# Patient Record
Sex: FEMALE | Race: BLACK OR AFRICAN AMERICAN | Employment: OTHER | URBAN - METROPOLITAN AREA
[De-identification: names, ages, dates, MRNs, and addresses within clinical notes are randomized per-mention and may not be internally consistent; named-entity substitution may affect disease eponyms.]

---

## 2017-01-01 ENCOUNTER — TELEPHONE (OUTPATIENT)
Dept: INTERNAL MEDICINE CLINIC | Age: 67
End: 2017-01-01

## 2017-01-01 ENCOUNTER — OFFICE VISIT (OUTPATIENT)
Dept: INTERNAL MEDICINE CLINIC | Age: 67
End: 2017-01-01

## 2017-01-01 ENCOUNTER — TELEPHONE (OUTPATIENT)
Dept: NEUROLOGY | Age: 67
End: 2017-01-01

## 2017-01-01 ENCOUNTER — HOSPITAL ENCOUNTER (OUTPATIENT)
Dept: MRI IMAGING | Age: 67
Discharge: HOME | End: 2017-06-09
Attending: PSYCHIATRY & NEUROLOGY
Payer: MEDICARE

## 2017-01-01 ENCOUNTER — HOSPITAL ENCOUNTER (OUTPATIENT)
Dept: MRI IMAGING | Age: 67
Discharge: HOME | End: 2017-05-17
Attending: PSYCHIATRY & NEUROLOGY

## 2017-01-01 ENCOUNTER — HOSPITAL ENCOUNTER (OUTPATIENT)
Dept: SLEEP MEDICINE | Age: 67
Discharge: HOME | End: 2017-07-31
Attending: INTERNAL MEDICINE
Payer: MEDICARE

## 2017-01-01 ENCOUNTER — HOSPITAL ENCOUNTER (OUTPATIENT)
Dept: LAB | Age: 67
Discharge: HOME | End: 2017-07-26
Payer: MEDICARE

## 2017-01-01 ENCOUNTER — OFFICE VISIT (OUTPATIENT)
Dept: NEUROLOGY | Age: 67
End: 2017-01-01

## 2017-01-01 ENCOUNTER — DOCUMENTATION ONLY (OUTPATIENT)
Dept: INTERNAL MEDICINE CLINIC | Age: 67
End: 2017-01-01

## 2017-01-01 ENCOUNTER — HOSPITAL ENCOUNTER (OUTPATIENT)
Dept: SLEEP MEDICINE | Age: 67
Discharge: HOME | End: 2017-04-14
Payer: MEDICARE

## 2017-01-01 ENCOUNTER — DOCUMENTATION ONLY (OUTPATIENT)
Dept: SLEEP MEDICINE | Age: 67
End: 2017-01-01

## 2017-01-01 ENCOUNTER — TELEPHONE (OUTPATIENT)
Dept: SLEEP MEDICINE | Age: 67
End: 2017-01-01

## 2017-01-01 ENCOUNTER — HOSPITAL ENCOUNTER (EMERGENCY)
Age: 67
Discharge: HOME | End: 2017-03-04
Attending: EMERGENCY MEDICINE
Payer: MEDICARE

## 2017-01-01 ENCOUNTER — OFFICE VISIT (OUTPATIENT)
Dept: SLEEP MEDICINE | Age: 67
End: 2017-01-01

## 2017-01-01 ENCOUNTER — HOSPITAL ENCOUNTER (OUTPATIENT)
Dept: LAB | Age: 67
Discharge: HOME | End: 2017-04-19
Payer: MEDICARE

## 2017-01-01 ENCOUNTER — HOSPITAL ENCOUNTER (OUTPATIENT)
Dept: SLEEP MEDICINE | Age: 67
Discharge: HOME | End: 2017-09-26
Payer: MEDICARE

## 2017-01-01 ENCOUNTER — HOSPITAL ENCOUNTER (OUTPATIENT)
Dept: MAMMOGRAPHY | Age: 67
Discharge: HOME | End: 2017-08-08
Attending: INTERNAL MEDICINE
Payer: MEDICARE

## 2017-01-01 ENCOUNTER — APPOINTMENT (OUTPATIENT)
Dept: GENERAL RADIOLOGY | Age: 67
End: 2017-01-01
Attending: EMERGENCY MEDICINE
Payer: MEDICARE

## 2017-01-01 ENCOUNTER — DOCUMENTATION ONLY (OUTPATIENT)
Dept: NEUROLOGY | Age: 67
End: 2017-01-01

## 2017-01-01 ENCOUNTER — HOSPITAL ENCOUNTER (OUTPATIENT)
Dept: SLEEP MEDICINE | Age: 67
Discharge: HOME | End: 2017-03-10
Attending: INTERNAL MEDICINE
Payer: MEDICARE

## 2017-01-01 VITALS
RESPIRATION RATE: 16 BRPM | WEIGHT: 252 LBS | DIASTOLIC BLOOD PRESSURE: 87 MMHG | OXYGEN SATURATION: 94 % | HEART RATE: 100 BPM | HEIGHT: 65 IN | TEMPERATURE: 97.7 F | SYSTOLIC BLOOD PRESSURE: 144 MMHG | BODY MASS INDEX: 41.99 KG/M2

## 2017-01-01 VITALS
HEART RATE: 87 BPM | DIASTOLIC BLOOD PRESSURE: 74 MMHG | BODY MASS INDEX: 43.78 KG/M2 | HEIGHT: 65 IN | OXYGEN SATURATION: 95 % | RESPIRATION RATE: 20 BRPM | SYSTOLIC BLOOD PRESSURE: 139 MMHG | TEMPERATURE: 98.1 F | WEIGHT: 262.8 LBS

## 2017-01-01 VITALS
HEART RATE: 78 BPM | RESPIRATION RATE: 18 BRPM | SYSTOLIC BLOOD PRESSURE: 140 MMHG | HEIGHT: 65 IN | BODY MASS INDEX: 44.98 KG/M2 | DIASTOLIC BLOOD PRESSURE: 88 MMHG | WEIGHT: 270 LBS | OXYGEN SATURATION: 91 %

## 2017-01-01 VITALS
SYSTOLIC BLOOD PRESSURE: 122 MMHG | BODY MASS INDEX: 43.99 KG/M2 | WEIGHT: 264 LBS | DIASTOLIC BLOOD PRESSURE: 70 MMHG | HEIGHT: 65 IN | HEART RATE: 79 BPM | RESPIRATION RATE: 18 BRPM

## 2017-01-01 VITALS
SYSTOLIC BLOOD PRESSURE: 122 MMHG | HEART RATE: 101 BPM | DIASTOLIC BLOOD PRESSURE: 81 MMHG | HEIGHT: 65 IN | WEIGHT: 264.5 LBS | TEMPERATURE: 97.4 F | OXYGEN SATURATION: 94 % | BODY MASS INDEX: 44.07 KG/M2

## 2017-01-01 VITALS
HEART RATE: 86 BPM | TEMPERATURE: 98.6 F | WEIGHT: 266 LBS | OXYGEN SATURATION: 93 % | RESPIRATION RATE: 12 BRPM | BODY MASS INDEX: 44.32 KG/M2 | HEIGHT: 65 IN | DIASTOLIC BLOOD PRESSURE: 80 MMHG | SYSTOLIC BLOOD PRESSURE: 149 MMHG

## 2017-01-01 VITALS
OXYGEN SATURATION: 95 % | WEIGHT: 259.1 LBS | HEART RATE: 96 BPM | BODY MASS INDEX: 43.17 KG/M2 | SYSTOLIC BLOOD PRESSURE: 135 MMHG | DIASTOLIC BLOOD PRESSURE: 79 MMHG | HEIGHT: 65 IN

## 2017-01-01 VITALS
HEIGHT: 65 IN | SYSTOLIC BLOOD PRESSURE: 130 MMHG | BODY MASS INDEX: 44.62 KG/M2 | DIASTOLIC BLOOD PRESSURE: 89 MMHG | WEIGHT: 267.8 LBS | HEART RATE: 83 BPM | OXYGEN SATURATION: 92 %

## 2017-01-01 VITALS
WEIGHT: 261 LBS | DIASTOLIC BLOOD PRESSURE: 76 MMHG | SYSTOLIC BLOOD PRESSURE: 128 MMHG | TEMPERATURE: 97.6 F | OXYGEN SATURATION: 94 % | HEART RATE: 77 BPM | RESPIRATION RATE: 24 BRPM | HEIGHT: 65 IN | BODY MASS INDEX: 43.49 KG/M2

## 2017-01-01 VITALS
RESPIRATION RATE: 20 BRPM | BODY MASS INDEX: 42.82 KG/M2 | OXYGEN SATURATION: 94 % | TEMPERATURE: 97.3 F | WEIGHT: 257 LBS | DIASTOLIC BLOOD PRESSURE: 86 MMHG | HEIGHT: 65 IN | SYSTOLIC BLOOD PRESSURE: 135 MMHG | HEART RATE: 86 BPM

## 2017-01-01 VITALS
DIASTOLIC BLOOD PRESSURE: 76 MMHG | WEIGHT: 257 LBS | HEIGHT: 65 IN | BODY MASS INDEX: 42.82 KG/M2 | RESPIRATION RATE: 18 BRPM | HEART RATE: 78 BPM | SYSTOLIC BLOOD PRESSURE: 122 MMHG

## 2017-01-01 DIAGNOSIS — R35.0 FREQUENT URINATION: Primary | ICD-10-CM

## 2017-01-01 DIAGNOSIS — I10 ESSENTIAL HYPERTENSION: ICD-10-CM

## 2017-01-01 DIAGNOSIS — M48.061 LUMBAR STENOSIS: ICD-10-CM

## 2017-01-01 DIAGNOSIS — R26.9 GAIT DISTURBANCE: ICD-10-CM

## 2017-01-01 DIAGNOSIS — G47.33 OSA (OBSTRUCTIVE SLEEP APNEA): ICD-10-CM

## 2017-01-01 DIAGNOSIS — E87.6 HYPOKALEMIA: ICD-10-CM

## 2017-01-01 DIAGNOSIS — B86 SCABIES INFESTATION: Primary | ICD-10-CM

## 2017-01-01 DIAGNOSIS — J44.9 CHRONIC OBSTRUCTIVE PULMONARY DISEASE, UNSPECIFIED COPD TYPE (HCC): ICD-10-CM

## 2017-01-01 DIAGNOSIS — G47.31 COMPLEX SLEEP APNEA SYNDROME: ICD-10-CM

## 2017-01-01 DIAGNOSIS — G47.31 COMPLEX SLEEP APNEA SYNDROME: Primary | ICD-10-CM

## 2017-01-01 DIAGNOSIS — G24.01 TARDIVE DYSKINESIA: ICD-10-CM

## 2017-01-01 DIAGNOSIS — I10 ESSENTIAL HYPERTENSION: Primary | ICD-10-CM

## 2017-01-01 DIAGNOSIS — R29.6 FALLS FREQUENTLY: ICD-10-CM

## 2017-01-01 DIAGNOSIS — B86 SCABIES INFESTATION: ICD-10-CM

## 2017-01-01 DIAGNOSIS — Z00.00 ROUTINE GENERAL MEDICAL EXAMINATION AT A HEALTH CARE FACILITY: ICD-10-CM

## 2017-01-01 DIAGNOSIS — R60.0 LOCALIZED EDEMA: ICD-10-CM

## 2017-01-01 DIAGNOSIS — M48.061 SPINAL STENOSIS OF LUMBAR REGION WITHOUT NEUROGENIC CLAUDICATION: ICD-10-CM

## 2017-01-01 DIAGNOSIS — R52 PAIN: ICD-10-CM

## 2017-01-01 DIAGNOSIS — M48.061 SPINAL STENOSIS OF LUMBAR REGION: Primary | ICD-10-CM

## 2017-01-01 DIAGNOSIS — G40.909 SEIZURE DISORDER (HCC): Primary | ICD-10-CM

## 2017-01-01 DIAGNOSIS — R20.0 BILATERAL LEG NUMBNESS: ICD-10-CM

## 2017-01-01 DIAGNOSIS — B35.4 TINEA CORPORIS: Primary | ICD-10-CM

## 2017-01-01 DIAGNOSIS — G40.909 SEIZURE DISORDER (HCC): ICD-10-CM

## 2017-01-01 DIAGNOSIS — G47.33 OSA TREATED WITH BIPAP: ICD-10-CM

## 2017-01-01 DIAGNOSIS — Z13.39 SCREENING FOR ALCOHOLISM: ICD-10-CM

## 2017-01-01 DIAGNOSIS — S92.902A FOOT FRACTURE, LEFT, CLOSED, INITIAL ENCOUNTER: Primary | ICD-10-CM

## 2017-01-01 DIAGNOSIS — M48.061 SPINAL STENOSIS OF LUMBAR REGION: ICD-10-CM

## 2017-01-01 DIAGNOSIS — F25.9 SCHIZOAFFECTIVE DISORDER, UNSPECIFIED TYPE (HCC): ICD-10-CM

## 2017-01-01 DIAGNOSIS — G47.33 OSA (OBSTRUCTIVE SLEEP APNEA): Primary | ICD-10-CM

## 2017-01-01 DIAGNOSIS — M79.642 LEFT HAND PAIN: ICD-10-CM

## 2017-01-01 DIAGNOSIS — Z87.898 HISTORY OF EPISTAXIS: ICD-10-CM

## 2017-01-01 DIAGNOSIS — G47.33 OSA TREATED WITH BIPAP: Primary | ICD-10-CM

## 2017-01-01 DIAGNOSIS — Z13.220 SCREENING FOR LIPID DISORDERS: ICD-10-CM

## 2017-01-01 DIAGNOSIS — R10.9 CHRONIC ABDOMINAL PAIN: ICD-10-CM

## 2017-01-01 DIAGNOSIS — F25.9 SCHIZOAFFECTIVE DISORDER, UNSPECIFIED TYPE (HCC): Primary | ICD-10-CM

## 2017-01-01 DIAGNOSIS — Z12.31 VISIT FOR SCREENING MAMMOGRAM: ICD-10-CM

## 2017-01-01 DIAGNOSIS — M54.2 NECK PAIN: ICD-10-CM

## 2017-01-01 DIAGNOSIS — E66.2 OBESITY HYPOVENTILATION SYNDROME (HCC): ICD-10-CM

## 2017-01-01 DIAGNOSIS — G89.29 CHRONIC ABDOMINAL PAIN: ICD-10-CM

## 2017-01-01 DIAGNOSIS — Z23 ENCOUNTER FOR IMMUNIZATION: ICD-10-CM

## 2017-01-01 LAB
ALBUMIN SERPL-MCNC: 4 G/DL (ref 3.6–4.8)
ALBUMIN/GLOB SERPL: 1.2 {RATIO} (ref 1.2–2.2)
ALP SERPL-CCNC: 72 IU/L (ref 39–117)
ALT SERPL-CCNC: 13 IU/L (ref 0–32)
AST SERPL-CCNC: 16 IU/L (ref 0–40)
BILIRUB SERPL-MCNC: <0.2 MG/DL (ref 0–1.2)
BILIRUB UR QL STRIP: NEGATIVE
BUN SERPL-MCNC: 23 MG/DL (ref 8–27)
BUN SERPL-MCNC: 25 MG/DL (ref 8–27)
BUN/CREAT SERPL: 30 (ref 12–28)
BUN/CREAT SERPL: 31 (ref 12–28)
CALCIUM SERPL-MCNC: 10 MG/DL (ref 8.7–10.3)
CALCIUM SERPL-MCNC: 9.4 MG/DL (ref 8.7–10.3)
CHLORIDE SERPL-SCNC: 102 MMOL/L (ref 96–106)
CHLORIDE SERPL-SCNC: 105 MMOL/L (ref 96–106)
CHOLEST SERPL-MCNC: 166 MG/DL (ref 100–199)
CO2 SERPL-SCNC: 22 MMOL/L (ref 18–29)
CO2 SERPL-SCNC: 27 MMOL/L (ref 18–29)
CREAT SERPL-MCNC: 0.75 MG/DL (ref 0.57–1)
CREAT SERPL-MCNC: 0.84 MG/DL (ref 0.57–1)
GLOBULIN SER CALC-MCNC: 3.3 G/DL (ref 1.5–4.5)
GLUCOSE SERPL-MCNC: 75 MG/DL (ref 65–99)
GLUCOSE SERPL-MCNC: 87 MG/DL (ref 65–99)
GLUCOSE UR-MCNC: NEGATIVE MG/DL
HDLC SERPL-MCNC: 56 MG/DL
KETONES P FAST UR STRIP-MCNC: NEGATIVE MG/DL
LDLC SERPL CALC-MCNC: 97 MG/DL (ref 0–99)
PH UR STRIP: 5.5 [PH] (ref 4.6–8)
POTASSIUM SERPL-SCNC: 4.4 MMOL/L (ref 3.5–5.2)
POTASSIUM SERPL-SCNC: 4.7 MMOL/L (ref 3.5–5.2)
PROT SERPL-MCNC: 7.3 G/DL (ref 6–8.5)
PROT UR QL STRIP: NEGATIVE MG/DL
SODIUM SERPL-SCNC: 143 MMOL/L (ref 134–144)
SODIUM SERPL-SCNC: 144 MMOL/L (ref 134–144)
SP GR UR STRIP: 1.01 (ref 1–1.03)
TRIGL SERPL-MCNC: 67 MG/DL (ref 0–149)
UA UROBILINOGEN AMB POC: NORMAL (ref 0.2–1)
URINALYSIS CLARITY POC: CLEAR
URINALYSIS COLOR POC: YELLOW
URINE BLOOD POC: NEGATIVE
URINE LEUKOCYTES POC: NEGATIVE
URINE NITRITES POC: NEGATIVE
VLDLC SERPL CALC-MCNC: 13 MG/DL (ref 5–40)

## 2017-01-01 PROCEDURE — 74011250637 HC RX REV CODE- 250/637: Performed by: NURSE PRACTITIONER

## 2017-01-01 PROCEDURE — 73630 X-RAY EXAM OF FOOT: CPT

## 2017-01-01 PROCEDURE — 80061 LIPID PANEL: CPT

## 2017-01-01 PROCEDURE — 80053 COMPREHEN METABOLIC PANEL: CPT

## 2017-01-01 PROCEDURE — 95811 POLYSOM 6/>YRS CPAP 4/> PARM: CPT | Performed by: INTERNAL MEDICINE

## 2017-01-01 PROCEDURE — 95811 POLYSOM 6/>YRS CPAP 4/> PARM: CPT

## 2017-01-01 PROCEDURE — 77067 SCR MAMMO BI INCL CAD: CPT

## 2017-01-01 PROCEDURE — 80048 BASIC METABOLIC PNL TOTAL CA: CPT

## 2017-01-01 PROCEDURE — 72148 MRI LUMBAR SPINE W/O DYE: CPT

## 2017-01-01 PROCEDURE — 95810 POLYSOM 6/> YRS 4/> PARAM: CPT | Performed by: INTERNAL MEDICINE

## 2017-01-01 PROCEDURE — 99284 EMERGENCY DEPT VISIT MOD MDM: CPT

## 2017-01-01 PROCEDURE — 73130 X-RAY EXAM OF HAND: CPT

## 2017-01-01 RX ORDER — IVERMECTIN 3 MG/1
12 TABLET ORAL ONCE
Qty: 4 TAB | Refills: 1 | Status: SHIPPED | OUTPATIENT
Start: 2017-01-01 | End: 2017-01-01 | Stop reason: SDUPTHER

## 2017-01-01 RX ORDER — IVERMECTIN 3 MG/1
12 TABLET ORAL ONCE
Qty: 4 TAB | Refills: 1 | OUTPATIENT
Start: 2017-01-01 | End: 2017-01-01

## 2017-01-01 RX ORDER — HYDROCODONE BITARTRATE AND ACETAMINOPHEN 5; 325 MG/1; MG/1
1 TABLET ORAL
Qty: 8 TAB | Refills: 0 | Status: SHIPPED | OUTPATIENT
Start: 2017-01-01 | End: 2017-01-01

## 2017-01-01 RX ORDER — IBUPROFEN 400 MG/1
800 TABLET ORAL
Status: COMPLETED | OUTPATIENT
Start: 2017-01-01 | End: 2017-01-01

## 2017-01-01 RX ORDER — IVERMECTIN 3 MG/1
12 TABLET ORAL ONCE
Qty: 4 TAB | Refills: 0 | Status: SHIPPED | OUTPATIENT
Start: 2017-01-01 | End: 2017-01-01 | Stop reason: SDUPTHER

## 2017-01-01 RX ORDER — TRAMADOL HYDROCHLORIDE 50 MG/1
100 TABLET ORAL
Status: COMPLETED | OUTPATIENT
Start: 2017-01-01 | End: 2017-01-01

## 2017-01-01 RX ORDER — BUTALBITAL, ACETAMINOPHEN AND CAFFEINE 300; 40; 50 MG/1; MG/1; MG/1
CAPSULE ORAL
COMMUNITY

## 2017-01-01 RX ORDER — POTASSIUM CHLORIDE 40 MEQ/15ML
SOLUTION ORAL
Qty: 480 ML | Refills: 1 | Status: SHIPPED | OUTPATIENT
Start: 2017-01-01 | End: 2017-01-01 | Stop reason: SDUPTHER

## 2017-01-01 RX ORDER — FEXOFENADINE HCL 60 MG
30 TABLET ORAL DAILY
Qty: 30 TAB | Refills: 0 | Status: SHIPPED | OUTPATIENT
Start: 2017-01-01 | End: 2017-01-01

## 2017-01-01 RX ORDER — LORATADINE 10 MG/1
10 TABLET ORAL
Qty: 30 TAB | Refills: 2 | Status: SHIPPED | OUTPATIENT
Start: 2017-01-01

## 2017-01-01 RX ORDER — DOXYLAMINE SUCCINATE 25 MG
TABLET ORAL 2 TIMES DAILY
Qty: 15 G | Refills: 0 | Status: SHIPPED | OUTPATIENT
Start: 2017-01-01 | End: 2017-01-01 | Stop reason: DRUGHIGH

## 2017-01-01 RX ORDER — POTASSIUM CHLORIDE 40 MEQ/15ML
SOLUTION ORAL
Qty: 473 ML | Refills: 1 | Status: SHIPPED | OUTPATIENT
Start: 2017-01-01

## 2017-01-01 RX ORDER — IBUPROFEN 800 MG/1
800 TABLET ORAL
Qty: 20 TAB | Refills: 0 | Status: SHIPPED | OUTPATIENT
Start: 2017-01-01 | End: 2017-01-01

## 2017-01-01 RX ORDER — DIVALPROEX SODIUM 500 MG/1
500 TABLET, DELAYED RELEASE ORAL 2 TIMES DAILY
Qty: 180 TAB | Refills: 1 | Status: SHIPPED | OUTPATIENT
Start: 2017-01-01 | End: 2018-01-01 | Stop reason: SDUPTHER

## 2017-01-01 RX ORDER — FUROSEMIDE 20 MG/1
20 TABLET ORAL EVERY OTHER DAY
Refills: 0 | COMMUNITY
Start: 2017-01-01 | End: 2017-01-01

## 2017-01-01 RX ORDER — TRIAMCINOLONE ACETONIDE 1 MG/G
OINTMENT TOPICAL 2 TIMES DAILY
Qty: 30 G | Refills: 0 | Status: SHIPPED | OUTPATIENT
Start: 2017-01-01 | End: 2017-01-01 | Stop reason: SDUPTHER

## 2017-01-01 RX ORDER — FUROSEMIDE 20 MG/1
20 TABLET ORAL DAILY
Refills: 0 | COMMUNITY
Start: 2017-01-01 | End: 2017-01-01

## 2017-01-01 RX ORDER — IVERMECTIN 3 MG/1
TABLET ORAL
Refills: 0 | OUTPATIENT
Start: 2017-01-01

## 2017-01-01 RX ORDER — BACLOFEN 10 MG/1
TABLET ORAL
Qty: 90 TAB | Refills: 1 | Status: SHIPPED | OUTPATIENT
Start: 2017-01-01

## 2017-01-01 RX ORDER — IVERMECTIN 3 MG/1
TABLET ORAL ONCE
COMMUNITY
End: 2017-01-01

## 2017-01-01 RX ORDER — PNEUMOCOCCAL 13-VALENT CONJUGATE VACCINE 2.2; 2.2; 2.2; 2.2; 2.2; 4.4; 2.2; 2.2; 2.2; 2.2; 2.2; 2.2; 2.2 UG/.5ML; UG/.5ML; UG/.5ML; UG/.5ML; UG/.5ML; UG/.5ML; UG/.5ML; UG/.5ML; UG/.5ML; UG/.5ML; UG/.5ML; UG/.5ML; UG/.5ML
INJECTION, SUSPENSION INTRAMUSCULAR
Refills: 0 | COMMUNITY
Start: 2017-01-01 | End: 2017-01-01

## 2017-01-01 RX ORDER — NAPROXEN 500 MG/1
TABLET ORAL
Refills: 0 | COMMUNITY
Start: 2017-01-01

## 2017-01-01 RX ORDER — OXYMETAZOLINE HCL 0.05 %
2 SPRAY, NON-AEROSOL (ML) NASAL 2 TIMES DAILY
Qty: 1 EACH | Refills: 0 | Status: SHIPPED | OUTPATIENT
Start: 2017-01-01 | End: 2017-01-01

## 2017-01-01 RX ORDER — IVERMECTIN 3 MG/1
TABLET ORAL
Refills: 0 | COMMUNITY
Start: 2017-01-01 | End: 2017-01-01

## 2017-01-01 RX ORDER — TETANUS TOXOID, REDUCED DIPHTHERIA TOXOID AND ACELLULAR PERTUSSIS VACCINE, ADSORBED 5; 2.5; 8; 8; 2.5 [IU]/.5ML; [IU]/.5ML; UG/.5ML; UG/.5ML; UG/.5ML
SUSPENSION INTRAMUSCULAR
Refills: 0 | COMMUNITY
Start: 2017-01-01 | End: 2017-01-01

## 2017-01-01 RX ORDER — DOXYLAMINE SUCCINATE 25 MG
TABLET ORAL
Refills: 0 | COMMUNITY
Start: 2017-01-01 | End: 2017-01-01

## 2017-01-01 RX ORDER — IVERMECTIN 3 MG/1
3 TABLET ORAL ONCE
Qty: 30 TAB | Refills: 0 | Status: CANCELLED | OUTPATIENT
Start: 2017-01-01 | End: 2017-01-01

## 2017-01-01 RX ORDER — CHLORPHENIRAMINE MALEATE 4 MG
TABLET ORAL 2 TIMES DAILY
COMMUNITY
End: 2017-01-01

## 2017-01-01 RX ORDER — TRIAMCINOLONE ACETONIDE 1 MG/G
OINTMENT TOPICAL 2 TIMES DAILY
Qty: 30 G | Refills: 0 | OUTPATIENT
Start: 2017-01-01

## 2017-01-01 RX ORDER — BACLOFEN 10 MG/1
TABLET ORAL
Qty: 90 TAB | Refills: 1 | Status: SHIPPED | OUTPATIENT
Start: 2017-01-01 | End: 2017-01-01 | Stop reason: SDUPTHER

## 2017-01-01 RX ORDER — CEPHALEXIN 500 MG/1
500 CAPSULE ORAL 4 TIMES DAILY
COMMUNITY

## 2017-01-01 RX ORDER — NAPROXEN 500 MG/1
TABLET ORAL
Refills: 0 | COMMUNITY
Start: 2017-01-01 | End: 2017-01-01

## 2017-01-01 RX ORDER — LORATADINE 10 MG/1
TABLET ORAL
Refills: 1 | COMMUNITY
Start: 2017-01-01 | End: 2017-01-01 | Stop reason: SDUPTHER

## 2017-01-01 RX ADMIN — TRAMADOL HYDROCHLORIDE 100 MG: 50 TABLET, FILM COATED ORAL at 21:50

## 2017-01-01 RX ADMIN — IBUPROFEN 800 MG: 400 TABLET, FILM COATED ORAL at 21:50

## 2017-01-19 ENCOUNTER — HOSPITAL ENCOUNTER (OUTPATIENT)
Dept: LAB | Age: 67
Discharge: HOME | End: 2017-01-19
Payer: MEDICARE

## 2017-01-19 ENCOUNTER — OFFICE VISIT (OUTPATIENT)
Dept: INTERNAL MEDICINE CLINIC | Age: 67
End: 2017-01-19

## 2017-01-19 VITALS
SYSTOLIC BLOOD PRESSURE: 128 MMHG | HEART RATE: 83 BPM | DIASTOLIC BLOOD PRESSURE: 89 MMHG | HEIGHT: 65 IN | OXYGEN SATURATION: 94 % | BODY MASS INDEX: 40.85 KG/M2 | TEMPERATURE: 97.3 F | WEIGHT: 245.2 LBS | RESPIRATION RATE: 12 BRPM

## 2017-01-19 DIAGNOSIS — D64.9 MILD CHRONIC ANEMIA: ICD-10-CM

## 2017-01-19 DIAGNOSIS — G89.29 CHRONIC BILATERAL LOWER ABDOMINAL PAIN: Primary | ICD-10-CM

## 2017-01-19 DIAGNOSIS — R10.32 CHRONIC BILATERAL LOWER ABDOMINAL PAIN: Primary | ICD-10-CM

## 2017-01-19 DIAGNOSIS — K59.09 CHRONIC CONSTIPATION: ICD-10-CM

## 2017-01-19 DIAGNOSIS — R63.5 WEIGHT GAIN: ICD-10-CM

## 2017-01-19 DIAGNOSIS — I10 ESSENTIAL HYPERTENSION: ICD-10-CM

## 2017-01-19 DIAGNOSIS — R10.31 CHRONIC BILATERAL LOWER ABDOMINAL PAIN: Primary | ICD-10-CM

## 2017-01-19 DIAGNOSIS — J44.9 CHRONIC OBSTRUCTIVE PULMONARY DISEASE, UNSPECIFIED COPD TYPE (HCC): ICD-10-CM

## 2017-01-19 LAB
BILIRUB UR QL STRIP: NEGATIVE
GLUCOSE UR-MCNC: NEGATIVE MG/DL
KETONES P FAST UR STRIP-MCNC: NEGATIVE MG/DL
PH UR STRIP: 5 [PH] (ref 4.6–8)
PROT UR QL STRIP: NEGATIVE MG/DL
SP GR UR STRIP: 1.01 (ref 1–1.03)
UA UROBILINOGEN AMB POC: NORMAL (ref 0.2–1)
URINALYSIS CLARITY POC: CLEAR
URINALYSIS COLOR POC: YELLOW
URINE BLOOD POC: NORMAL
URINE LEUKOCYTES POC: NEGATIVE
URINE NITRITES POC: NEGATIVE

## 2017-01-19 PROCEDURE — 80048 BASIC METABOLIC PNL TOTAL CA: CPT

## 2017-01-19 PROCEDURE — 85027 COMPLETE CBC AUTOMATED: CPT

## 2017-01-19 RX ORDER — POLYETHYLENE GLYCOL 3350 17 G/17G
8.5 POWDER, FOR SOLUTION ORAL DAILY
Qty: 850 G | Refills: 5 | Status: SHIPPED | OUTPATIENT
Start: 2017-01-19 | End: 2017-01-01

## 2017-01-19 NOTE — PROGRESS NOTES
Chief Complaint   Patient presents with    Weight Management     f/u    Blood Pressure Check    Cough    Dysuria     still painful     Room 3

## 2017-01-19 NOTE — PATIENT INSTRUCTIONS
You are well on exam today. Please go to [facility]) to re-establish services. Continue miralax at 1/2 dose every day. Constipation: Care Instructions  Your Care Instructions  Constipation means that you have a hard time passing stools (bowel movements). People pass stools from 3 times a day to once every 3 days. What is normal for you may be different. Constipation may occur with pain in the rectum and cramping. The pain may get worse when you try to pass stools. Sometimes there are small amounts of bright red blood on toilet paper or the surface of stools. This is because of enlarged veins near the rectum (hemorrhoids). A few changes in your diet and lifestyle may help you avoid ongoing constipation. Your doctor may also prescribe medicine to help loosen your stool. Some medicines can cause constipation. These include pain medicines and antidepressants. Tell your doctor about all the medicines you take. Your doctor may want to make a medicine change to ease your symptoms. Follow-up care is a key part of your treatment and safety. Be sure to make and go to all appointments, and call your doctor if you are having problems. It's also a good idea to know your test results and keep a list of the medicines you take. How can you care for yourself at home? · Drink plenty of fluids, enough so that your urine is light yellow or clear like water. If you have kidney, heart, or liver disease and have to limit fluids, talk with your doctor before you increase the amount of fluids you drink. · Include high-fiber foods in your diet each day. These include fruits, vegetables, beans, and whole grains. · Get at least 30 minutes of exercise on most days of the week. Walking is a good choice. You also may want to do other activities, such as running, swimming, cycling, or playing tennis or team sports. · Take a fiber supplement, such as Citrucel or Metamucil, every day.  Read and follow all instructions on the label. · Schedule time each day for a bowel movement. A daily routine may help. Take your time having your bowel movement. · Support your feet with a small step stool when you sit on the toilet. This helps flex your hips and places your pelvis in a squatting position. · Your doctor may recommend an over-the-counter laxative to relieve your constipation. Examples are Milk of Magnesia and MiraLax. Read and follow all instructions on the label. Do not use laxatives on a long-term basis. When should you call for help? Call your doctor now or seek immediate medical care if:  · You have new or worse belly pain. · You have new or worse nausea or vomiting. · You have blood in your stools. Watch closely for changes in your health, and be sure to contact your doctor if:  · Your constipation is getting worse. · You do not get better as expected. Where can you learn more? Go to http://rose-jaiden.info/. Enter 21 [phone] in the search box to learn more about \"Constipation: Care Instructions. \"  Current as of: May 27, 2016  Content Version: 11.1  © 0579-6349 [employer], [employer]. Care instructions adapted under license by [employer] (which disclaims liability or warranty for this information). If you have questions about a medical condition or this instruction, always ask your healthcare professional. [address] any warranty or liability for your use of this information.

## 2017-01-19 NOTE — PROGRESS NOTES
OLIVIA Shafer is a 77 y.o. female, she presents today for:    Presents in follow-up. Continues to have cough, unchanged. Has continued to do breathing exercises, using inhalers. Not getting out of apartment very often. Trying to think about how to catch the bus and go out. Reviewed upcoming appointments with Dr. Joshua Diaz and Dr. Isaac Youssef. Notes that she doesn it all on her own. Sister and son take her to get food. PMH/PSH: reviewed and updated  Sochx:  reports that she has never smoked. She has never used smokeless tobacco. She reports that she does not drink alcohol or use illicit drugs. Famhx: reviewed and updated     All: Allergies   Allergen Reactions    Haldol [Haloperidol Lactate] Other (comments)     Med:   Current Outpatient Prescriptions   Medication Sig    potassium chloride (KAON 20%) 40 mEq/15 mL liqd take 7.5 milliliters by mouth once daily for HYPOKALEMIA PREVENTION    divalproex DR (DEPAKOTE) 500 mg tablet Take 1 Tab by mouth two (2) times a day.  inhalational spacing device 1 Each by Does Not Apply route as needed.  baclofen (LIORESAL) 10 mg tablet take 1/2 tablet by mouth twice a day    albuterol (PROAIR HFA) 90 mcg/actuation inhaler Take 1 Puff by inhalation every four (4) hours as needed. Indications: BRONCHOSPASM PREVENTION    benztropine (COGENTIN) 1 mg tablet Take 1 mg by mouth two (2) times a day.  anastrozole (ARIMIDEX) 1 mg tablet take 1 tablet by mouth once daily    loratadine (CLARITIN) 10 mg tablet TAKE 1 TABLET BY MOUTH EVERY DAY    FLUoxetine (PROZAC) 10 mg capsule TAKE ONE CAPSULE BY MOUTH EVERY DAY    risperiDONE (RISPERDAL) 2 mg tablet Take 1 Tab by mouth two (2) times a day.  aspirin delayed-release 81 mg tablet Take 1 Tab by mouth daily.  gabapentin (NEURONTIN) 300 mg capsule Take 1 Cap by mouth two (2) times a day.     butalbital-acetaminophen-caffeine (FIORICET, ESGIC) -40 mg per tablet Take 1 Tab by mouth every four (4) hours as needed for Headache. No current facility-administered medications for this visit. Review of Systems   Constitutional: Negative for chills and fever. Respiratory: Positive for cough. Negative for shortness of breath and wheezing. Cardiovascular: Negative for chest pain. PE:  Blood pressure 128/89, pulse 83, temperature 97.3 °F (36.3 °C), temperature source Oral, resp. rate 12, height 5' 5\" (1.651 m), weight 245 lb 3.2 oz (111.2 kg), SpO2 94 %. Body mass index is 40.8 kg/(m^2). Physical Exam   Constitutional: She is oriented to person, place, and time. She appears well-developed and well-nourished. No distress. obesity   HENT:   Head: Normocephalic. Mouth/Throat: Oropharynx is clear and moist.   Eyes: Pupils are equal, round, and reactive to light. Neck: Normal range of motion. Neck supple. Cardiovascular: Normal rate and regular rhythm. Pulmonary/Chest: Effort normal. She has no wheezes. She has no rales. Good aeration on exam.    Neurological: She is alert and oriented to person, place, and time. Mild apraxia, at baseline   Nursing note and vitals reviewed. Labs:   Results for orders placed or performed in visit on 01/19/17   AMB POC URINALYSIS DIP STICK AUTO W/O MICRO   Result Value Ref Range    Color (UA POC) Yellow     Clarity (UA POC) Clear     Glucose (UA POC) Negative Negative    Bilirubin (UA POC) Negative Negative    Ketones (UA POC) Negative Negative    Specific gravity (UA POC) 1.015 1.001 - 1.035    Blood (UA POC) Trace Negative    pH (UA POC) 5 4.6 - 8.0    Protein (UA POC) Negative Negative mg/dL    Urobilinogen (UA POC) 0.2 mg/dL 0.2 - 1    Nitrites (UA POC) Negative Negative    Leukocyte esterase (UA POC) Negative Negative       A/P:  77 y.o. female    ICD-10-CM ICD-9-CM    1. Chronic bilateral lower abdominal pain R10.31 789.03 AMB POC URINALYSIS DIP STICK AUTO W/O MICRO    G89.29 338.29     R10.32 789.04    2.  Chronic obstructive pulmonary disease, unspecified COPD type ([facility] J44.9 496    3. Chronic constipation K59.09 564.00 polyethylene glycol (MIRALAX) 17 gram/dose powder   4. Essential hypertension L00 766.8 METABOLIC PANEL, BASIC   5. Mild chronic anemia D64.9 285.9 CBC W/O DIFF   6. Weight gain R63.5 783.1      Chronic abd pain:  Per chart this has been complaint for years, Abdominal imaging without abnormality, most recently in august 2016. Normal urine tests, including today. Encouarged working on improved mobility and weigh tloss. Continue to treat crhonic constipation. COPD: well controlled. Continue to work on breathing exercises/pulmonary toilet. Mild chronic anemia: repeat CBC today    HTN: BP well controlled, labs today    Follow-up Disposition:  Return in about 3 months (around 4/19/2017) for 30 minutes, follow-up weight, HTN.

## 2017-01-19 NOTE — MR AVS SNAPSHOT
Visit Information Date & Time Provider Department Dept. Phone Encounter #  
 1/19/2017 10:30 AM Mackenzie Samuels MD [address] [facility] [phone] [id number] Follow-up Instructions Return in about 3 months (around 4/19/2017) for 30 minutes, follow-up weight, HTN. Your Appointments 1/31/2017 10:40 AM  
Any with Mariluz Abbott MD  
[address] [facility] ([facility] Appt Note: DME took machine- non compliant. Needs re qualification. [place] [address] [phone]  
[place] [address] [facility] [address] [phone]  
  
    
 5/8/2017  2:40 PM  
Follow Up with Corazon Baldwin MD  
[facility] at [address] [facility]) Appt Note: follow up  seizures cl  
 [address] [facility] [address] [address]  
[phone]  
  
   
 [address] [place] [id number]  
  
    
 7/20/2017  2:00 PM  
Any with Mariluz Abbott MD  
[address] [facility] Appt Note: yrly cpap follow up [place] [address] [phone]  
[phone] Upcoming Health Maintenance Date Due DTaP/Tdap/Td series (1 - Tdap) 8/15/1971 GLAUCOMA SCREENING Q2Y 8/15/2015 FOBT Q 1 YEAR AGE 50-75 3/25/2016 MEDICARE YEARLY EXAM 7/22/2017 BREAST CANCER SCRN MAMMOGRAM 8/12/2018 Pneumococcal 65+ High/Highest Risk (2 of 2 - PPSV23) 6/1/2019 Allergies as of 1/19/2017  Review Complete On: 1/19/2017 By: Lillian Garcia LPN Severity Noted Reaction Type Reactions Haldol [Haloperidol Lactate]  08/26/2014    Other (comments) Current Immunizations  Reviewed on 3/11/2015 Name Date Influenza High Dose Vaccine PF 8/22/2016 Influenza Vaccine 10/15/2014 Influenza Vaccine Split 10/13/2010 Pneumococcal Conjugate (PCV-13) 7/21/2016 Pneumococcal Vaccine (Unspecified Type) 6/1/2014 Zoster Vaccine, Live 8/22/2016 Not reviewed this visit You Were Diagnosed With   
  
 Codes Comments Chronic bilateral lower abdominal pain    -  Primary ICD-10-CM: R10.31, G89.29, R10.32 
ICD-9-CM: 789.03, 338.29, 789.04 Chronic obstructive pulmonary disease, unspecified COPD type ([facility]     ICD-10-CM: J44.9 ICD-9-CM: 727 Chronic constipation     ICD-10-CM: K59.09 
ICD-9-CM: 564.00 Essential hypertension     ICD-10-CM: I10 
ICD-9-CM: 401.9 Mild chronic anemia     ICD-10-CM: D64.9 ICD-9-CM: 285.9 Weight gain     ICD-10-CM: R63.5 ICD-9-CM: 783.1 Vitals BP Pulse Temp Resp Height(growth percentile) Weight(growth percentile) 128/89 83 97.3 °F (36.3 °C) (Oral) 12 5' 5\" (1.651 m) 245 lb 3.2 oz (111.2 kg) SpO2 BMI OB Status Smoking Status 94% 40.8 kg/m2 Hysterectomy Never Smoker BMI and BSA Data Body Mass Index Body Surface Area  
 40.8 kg/m 2 2.26 m 2 Preferred Pharmacy Pharmacy Name Phone [facility] Stana Meckel, [address] [place] [address] Lydia Botello [phone] Your Updated Medication List  
  
   
This list is accurate as of: 1/19/17 11:17 AM.  Always use your most recent med list.  
  
  
  
  
 albuterol 90 mcg/actuation inhaler Commonly known as:  PROAIR HFA Take 1 Puff by inhalation every four (4) hours as needed. Indications: BRONCHOSPASM PREVENTION  
  
 anastrozole 1 mg tablet Commonly known as:  ARIMIDEX  
take 1 tablet by mouth once daily  
  
 aspirin delayed-release 81 mg tablet Take 1 Tab by mouth daily. baclofen 10 mg tablet Commonly known as:  LIORESAL  
take 1/2 tablet by mouth twice a day  
  
 benztropine 1 mg tablet Commonly known as:  COGENTIN Take 1 mg by mouth two (2) times a day. butalbital-acetaminophen-caffeine -40 mg per tablet Commonly known as:  Kan Mixon  
 Take 1 Tab by mouth every four (4) hours as needed for Headache. DEPAKOTE 500 mg tablet Generic drug:  divalproex DR Take 1 Tab by mouth two (2) times a day. FLUoxetine 10 mg capsule Commonly known as:  PROzac TAKE ONE CAPSULE BY MOUTH EVERY DAY  
  
 gabapentin 300 mg capsule Commonly known as:  NEURONTIN Take 1 Cap by mouth two (2) times a day. inhalational spacing device 1 Each by Does Not Apply route as needed. loratadine 10 mg tablet Commonly known as:  CLARITIN  
TAKE 1 TABLET BY MOUTH EVERY DAY  
  
 polyethylene glycol 17 gram/dose powder Commonly known as:  Clara Jenaro Take 8.5 g by mouth daily. potassium chloride 40 mEq/15 mL Liqd Commonly known as:  KAON 20%  
take 7.5 milliliters by mouth once daily for HYPOKALEMIA PREVENTION  
  
 risperiDONE 2 mg tablet Commonly known as:  RisperDAL Take 1 Tab by mouth two (2) times a day. Prescriptions Sent to Pharmacy Refills  
 polyethylene glycol (MIRALAX) 17 gram/dose powder 5 Sig: Take 8.5 g by mouth daily. Class: Normal  
 Pharmacy: [address] #: [phone] Route: Oral  
  
We Performed the Following AMB POC URINALYSIS DIP STICK AUTO W/O MICRO [32439 CPT(R)] CBC W/O DIFF [14810 CPT(R)] METABOLIC PANEL, BASIC [98628 CPT(R)] Follow-up Instructions Return in about 3 months (around 4/19/2017) for 30 minutes, follow-up weight, HTN. Patient Instructions You are well on exam today. Please go to [facility] to re-establish services. Continue miralax at 1/2 dose every day. Constipation: Care Instructions Your Care Instructions Constipation means that you have a hard time passing stools (bowel movements). People pass stools from 3 times a day to once every 3 days. What is normal for you may be different.  Constipation may occur with pain in the rectum and cramping. The pain may get worse when you try to pass stools. Sometimes there are small amounts of bright red blood on toilet paper or the surface of stools. This is because of enlarged veins near the rectum (hemorrhoids). A few changes in your diet and lifestyle may help you avoid ongoing constipation. Your doctor may also prescribe medicine to help loosen your stool. Some medicines can cause constipation. These include pain medicines and antidepressants. Tell your doctor about all the medicines you take. Your doctor may want to make a medicine change to ease your symptoms. Follow-up care is a key part of your treatment and safety. Be sure to make and go to all appointments, and call your doctor if you are having problems. It's also a good idea to know your test results and keep a list of the medicines you take. How can you care for yourself at home? · Drink plenty of fluids, enough so that your urine is light yellow or clear like water. If you have kidney, heart, or liver disease and have to limit fluids, talk with your doctor before you increase the amount of fluids you drink. · Include high-fiber foods in your diet each day. These include fruits, vegetables, beans, and whole grains. · Get at least 30 minutes of exercise on most days of the week. Walking is a good choice. You also may want to do other activities, such as running, swimming, cycling, or playing tennis or team sports. · Take a fiber supplement, such as Citrucel or Metamucil, every day. Read and follow all instructions on the label. · Schedule time each day for a bowel movement. A daily routine may help. Take your time having your bowel movement. · Support your feet with a small step stool when you sit on the toilet. This helps flex your hips and places your pelvis in a squatting position.  
· Your doctor may recommend an over-the-counter laxative to relieve your constipation. Examples are Milk of Magnesia and MiraLax. Read and follow all instructions on the label. Do not use laxatives on a long-term basis. When should you call for help? Call your doctor now or seek immediate medical care if: 
· You have new or worse belly pain. · You have new or worse nausea or vomiting. · You have blood in your stools. Watch closely for changes in your health, and be sure to contact your doctor if: 
· Your constipation is getting worse. · You do not get better as expected. Where can you learn more? Go to http://rose-jaiden.info/. Enter 21  in the search box to learn more about \"Constipation: Care Instructions. \" Current as of: May 27, 2016 Content Version: 11.1 © 6057-9461 [employer]. Care instructions adapted under license by [employer] (which disclaims liability or warranty for this information). If you have questions about a medical condition or this instruction, always ask your healthcare professional. [place] [address] any warranty or liability for your use of this information. Introducing [facility]! Dear Jemma: Thank you for requesting a eReceipts account. Our records indicate that you already have an active eReceipts account. You can access your account anytime at [email or web address] Did you know that you can access your hospital and ER discharge instructions at any time in eReceipts? You can also review all of your test results from your hospital stay or ER visit. Additional Information If you have questions, please visit the Frequently Asked Questions section of the eReceipts website at [email or web address] Remember, eReceipts is NOT to be used for urgent needs. For medical emergencies, dial 911. Now available from your iPhone and Android! Please provide this summary of care documentation to your next provider. Your primary care clinician is listed as Kayla Osorio. If you have any questions after today's visit, please call [phone].

## 2017-01-20 LAB
BUN SERPL-MCNC: 21 MG/DL (ref 8–27)
BUN/CREAT SERPL: 21 (ref 11–26)
CALCIUM SERPL-MCNC: 10 MG/DL (ref 8.7–10.3)
CHLORIDE SERPL-SCNC: 99 MMOL/L (ref 96–106)
CO2 SERPL-SCNC: 26 MMOL/L (ref 18–29)
CREAT SERPL-MCNC: 0.98 MG/DL (ref 0.57–1)
ERYTHROCYTE [DISTWIDTH] IN BLOOD BY AUTOMATED COUNT: 15 % (ref 12.3–15.4)
GLUCOSE SERPL-MCNC: 83 MG/DL (ref 65–99)
HCT VFR BLD AUTO: 34.9 % (ref 34–46.6)
HGB BLD-MCNC: 11.6 G/DL (ref 11.1–15.9)
MCH RBC QN AUTO: 28.3 PG (ref 26.6–33)
MCHC RBC AUTO-ENTMCNC: 33.2 G/DL (ref 31.5–35.7)
MCV RBC AUTO: 85 FL (ref 79–97)
PLATELET # BLD AUTO: 235 X10E3/UL (ref 150–379)
POTASSIUM SERPL-SCNC: 5 MMOL/L (ref 3.5–5.2)
RBC # BLD AUTO: 4.1 X10E6/UL (ref 3.77–5.28)
SODIUM SERPL-SCNC: 142 MMOL/L (ref 134–144)
WBC # BLD AUTO: 8.1 X10E3/UL (ref 3.4–10.8)

## 2017-01-31 ENCOUNTER — OFFICE VISIT (OUTPATIENT)
Dept: SLEEP MEDICINE | Age: 67
End: 2017-01-31

## 2017-01-31 ENCOUNTER — TELEPHONE (OUTPATIENT)
Dept: SLEEP MEDICINE | Age: 67
End: 2017-01-31

## 2017-01-31 VITALS
SYSTOLIC BLOOD PRESSURE: 123 MMHG | DIASTOLIC BLOOD PRESSURE: 82 MMHG | BODY MASS INDEX: 40.98 KG/M2 | HEIGHT: 65 IN | HEART RATE: 78 BPM | WEIGHT: 246 LBS | OXYGEN SATURATION: 94 %

## 2017-01-31 DIAGNOSIS — Z86.73 H/O: STROKE: ICD-10-CM

## 2017-01-31 DIAGNOSIS — G47.33 OSA (OBSTRUCTIVE SLEEP APNEA): Primary | ICD-10-CM

## 2017-01-31 DIAGNOSIS — J44.9 CHRONIC OBSTRUCTIVE PULMONARY DISEASE, UNSPECIFIED COPD TYPE (HCC): ICD-10-CM

## 2017-01-31 NOTE — PATIENT INSTRUCTIONS
217 [facility]., [place]. [place], [address]  Tel.  [phone]  Fax. 100 [facility] 60  [place], [address] [facility]  Tel.  [phone]  Fax. [phone] [address] [address] Jimmy Mirza  Tel.  [phone]  Fax. [phone]     Sleep Apnea: After Your Visit  Your Care Instructions  Sleep apnea occurs when you frequently stop breathing for 10 seconds or longer during sleep. It can be mild to severe, based on the number of times per hour that you stop breathing or have slowed breathing. Blocked or narrowed airways in your nose, mouth, or throat can cause sleep apnea. Your airway can become blocked when your throat muscles and tongue relax during sleep. Sleep apnea is common, occurring in 1 out of 20 individuals. Individuals having any of the following characteristics should be evaluated and treated right away due to high risk and detrimental consequences from untreated sleep apnea:  1. Obesity  2. Congestive Heart failure  3. Atrial Fibrillation  4. Uncontrolled Hypertension  5. Type II Diabetes  6. Night-time Arrhythmias  7. Stroke  8. Pulmonary Hypertension  9. High-risk Driving Populations (pilots, truck drivers, etc.)  10. Patients Considering Weight-loss Surgery    How do you know you have sleep apnea? You probably have sleep apnea if you answer 'yes' to 3 or more of the following questions:  S - Have you been told that you Snore? T - Are you often Tired during the day? O - Has anyone Observed you stop breathing while sleeping? P- Do you have (or are being treated for) high blood Pressure? B - Are you obese (Body Mass Index > 35)? A - Is your Age 48years old or older? N - Is your Neck size greater than 16 inches? G - Are you male Gender? A sleep physician can prescribe a breathing device that prevents tissues in the throat from blocking your airway.  Or your doctor may recommend using a dental device (oral breathing device) to help keep your airway open. In some cases, surgery may be needed to remove enlarged tissues in the throat. Follow-up care is a key part of your treatment and safety. Be sure to make and go to all appointments, and call your doctor if you are having problems. It's also a good idea to know your test results and keep a list of the medicines you take. How can you care for yourself at home? · Lose weight, if needed. It may reduce the number of times you stop breathing or have slowed breathing. · Go to bed at the same time every night. · Sleep on your side. It may stop mild apnea. If you tend to roll onto your back, sew a pocket in the back of your pajama top. Put a tennis ball into the pocket, and stitch the pocket shut. This will help keep you from sleeping on your back. · Avoid alcohol and medicines such as sleeping pills and sedatives before bed. · Do not smoke. Smoking can make sleep apnea worse. If you need help quitting, talk to your doctor about stop-smoking programs and medicines. These can increase your chances of quitting for good. · Prop up the head of your bed 4 to 6 inches by putting bricks under the legs of the bed. · Treat breathing problems, such as a stuffy nose, caused by a cold or allergies. · Use a continuous positive airway pressure (CPAP) breathing machine if lifestyle changes do not help your apnea and your doctor recommends it. The machine keeps your airway from closing when you sleep. · If CPAP does not help you, ask your doctor whether you should try other breathing machines. A bilevel positive airway pressure machine has two types of air pressureâone for breathing in and one for breathing out. Another device raises or lowers air pressure as needed while you breathe. · If your nose feels dry or bleeds when using one of these machines, talk with your doctor about increasing moisture in the air. A humidifier may help.   · If your nose is runny or stuffy from using a breathing machine, talk with your doctor about using decongestants or a corticosteroid nasal spray. When should you call for help? Watch closely for changes in your health, and be sure to contact your doctor if:  · You still have sleep apnea even though you have made lifestyle changes. · You are thinking of trying a device such as CPAP. · You are having problems using a CPAP or similar machine. Where can you learn more? Go to [employer]. Enter Z814 in the search box to learn more about \"Sleep Apnea: After Your Visit. \"   © 5728-7900 Healthwise, [employer]. Care instructions adapted under license by [employer] (which disclaims liability or warranty for this information). This care instruction is for use with your licensed healthcare professional. If you have questions about a medical condition or this instruction, always ask your healthcare professional. Cloyce Pellet any warranty or liability for your use of this information. PROPER SLEEP HYGIENE    What to avoid  · Do not have drinks with caffeine, such as coffee or black tea, for 8 hours before bed. · Do not smoke or use other types of tobacco near bedtime. Nicotine is a stimulant and can keep you awake. · Avoid drinking alcohol late in the evening, because it can cause you to wake in the middle of the night. · Do not eat a big meal close to bedtime. If you are hungry, eat a light snack. · Do not drink a lot of water close to bedtime, because the need to urinate may wake you up during the night. · Do not read or watch TV in bed. Use the bed only for sleeping and sexual activity. What to try  · Go to bed at the same time every night, and wake up at the same time every morning. Do not take naps during the day. · Keep your bedroom quiet, dark, and cool. · Get regular exercise, but not within 3 to 4 hours of your bedtime. .  · Sleep on a comfortable pillow and mattress.   · If watching the clock makes you anxious, turn it facing away from you so you cannot see the time. · If you worry when you lie down, start a worry book. Well before bedtime, write down your worries, and then set the book and your concerns aside. · Try meditation or other relaxation techniques before you go to bed. · If you cannot fall asleep, get up and go to another room until you feel sleepy. Do something relaxing. Repeat your bedtime routine before you go to bed again. · Make your house quiet and calm about an hour before bedtime. Turn down the lights, turn off the TV, log off the computer, and turn down the volume on music. This can help you relax after a busy day. Drowsy Driving  The [address] [place] [address] Road Traffic Safety Administration cites drowsiness as a causing factor in more than 414,933 police reported crashes annually, resulting in 76,000 injuries and 1,500 deaths. Other surveys suggest 55% of people polled have driven while drowsy in the past year, 23% had fallen asleep but not crashed, 3% crashed, and 2% had and accident due to drowsy driving. Who is at risk? Young Drivers: One study of drowsy driving accidents states that 55% of the drivers were under 25 years. Of those, 75% were male. Shift Workers and Travelers: People who work overnight or travel across time zones frequently are at higher risk of experiencing Circadian Rhythm Disorders. They are trying to work and function when their body is programed to sleep. Sleep Deprived: Lack of sleep has a serious impact on your ability to pay attention or focus on a task. Consistently getting less than the average of 8 hours your body needs creates partial or cumulative sleep deprivation. Untreated Sleep Disorders: Sleep Apnea, Narcolepsy, R.L.S., and other sleep disorders (untreated) prevent a person from getting enough restful sleep. This leads to excessive daytime sleepiness and increases the risk for drowsy driving accidents by up to 7 times.   Medications / Alcohol: Even over the counter medications can cause drowsiness. Medications that impair a drivers attention should have a warning label. Alcohol naturally makes you sleepy and on its own can cause accidents. Combined with excessive drowsiness its effects are amplified. Signs of Drowsy Driving:   * You don't remember driving the last few miles   * You may drift out of your yair   * You are unable to focus and your thoughts wander   * You may yawn more often than normal   * You have difficulty keeping your eyes open / nodding off   * Missing traffic signs, speeding, or tailgating  Prevention-   Good sleep hygiene, lifestyle and behavioral choices have the most impact on drowsy driving. There is no substitute for sleep and the average person requires 8 hours nightly. If you find yourself driving drowsy, stop and sleep. Consider the sleep hygiene tips provided during your visit as well. Medication Refill Policy: Refills for all medications require 1 week advance notice. Please have your pharmacy fax a refill request. We are unable to fax, or call in \"controled substance\" medications and you will need to pick these prescriptions up from our office. [employer] Activation    Thank you for requesting access to [employer]. Please follow the instructions below to securely access and download your online medical record. [employer] allows you to send messages to your doctor, view your test results, renew your prescriptions, schedule appointments, and more. How Do I Sign Up? 1. In your internet browser, go to [email or web address]. 2. Click on the First Time User? Click Here link in the Sign In box. You will see the New Member Sign Up page. 3. Enter your [employer] Access Code exactly as it appears below. You will not need to use this code after youve completed the sign-up process. If you do not sign up before the expiration date, you must request a new code. [employer] Access Code:  Activation code not generated  Current [employer] Status: Active (This is the date your [employer] access code will )    4. Enter the last four digits of your Social Security Number (xxxx) and Date of Birth (mm/dd/yyyy) as indicated and click Submit. You will be taken to the next sign-up page. 5. Create a ActionFlow ID. This will be your ActionFlow login ID and cannot be changed, so think of one that is secure and easy to remember. 6. Create a ActionFlow password. You can change your password at any time. 7. Enter your Password Reset Question and Answer. This can be used at a later time if you forget your password. 8. Enter your e-mail address. You will receive e-mail notification when new information is available in [address] 9. Click Sign Up. You can now view and download portions of your medical record. 10. Click the Download Summary menu link to download a portable copy of your medical information. Additional Information    If you have questions, please call [phone]. Remember, [employer] is NOT to be used for urgent needs. For medical emergencies, dial 911.

## 2017-01-31 NOTE — TELEPHONE ENCOUNTER
After scheduling patient for her sleep study on 3/10/17, patient requested I call [address] on her behalf to schedule her  and drop off for sleep study. Spoke with Zonia and scheduled her departure from home to [facility]; transportation will be there by 8:45pm and request she be ready by 8:30pm- reference number is #[id number]. Zonia scheduled her to be picked up by [facility] at 7:00am at the same spot the patient was dropped off at. Reference number is #[id number]. Provided all of this information to patient on 1/31/17.

## 2017-03-05 NOTE — ED PROVIDER NOTES
HPI Comments: Sophia Javier is a 77 y.o. female  who presents via WC  to [facility] ED with cc of L hand/foot pain and nose bleed, cough. Pt reports that she was trying to get off the toilet to her walker and fell forward. She states that she hit her L hand and foot during the fall. She reports increased swelling and pain to the hand since the fall. She has not taken any medications PTA for her symptoms. She denies any head injury, LOC, neck or back pain. She reports she also had intermittent nose bleeds with rhinorrhea and congestion for a week. She reports a productive cough of clear mucus as well. She denies any CP, SOB, fevers, chills. She states she has history of seasonal allergies but does not take any medications for this. She reports nosebleeds in the past, but never at this duration. No hx of nasal trauma. PCP: Alina Vasques MD    There are no other complaints, changes or physical findings at this time. The history is provided by the patient. 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 ([address]     Fall 2/2/2015    Gait disturbance     Hyperlipidemia     Hypertension     Infiltrating ductal carcinoma of breast (HCC)     s/p partial right mastectomy 4/21/11 s/p XRT and chemotherapy    Peripheral neuropathy ([address]     NCS/EMG 1/4/16 - peripheral polyneuropathy    Psychiatric disorder     Depression    Rhabdomyolysis     Schizoaffective disorder     Sepsis ([address]     Stroke ([address]     Syncope 11/30/2014    Tardive dyskinesia     Temporal lobe lesion     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 Mastectomy partial    COLONOSCOPY N/A 11/3/2016    COLONOSCOPY performed by Rick Monroe.  Keli Lundberg MD at [address] [place] [address] left temporal lobe cyst resection    HX ORTHOPAEDIC  7/2005    left ankle arthroscopic    HX TOTAL ABDOMINAL HYSTERECTOMY  1980         Family History:   Problem Relation Age of Onset    Heart Disease Mother     Hypertension Mother     Breast Cancer Mother      Dec 56yo    Cancer Sister     No Known Problems Son        Social History     Social History    Marital status:      Spouse name: N/A    Number of children: N/A    Years of education: N/A     Occupational History    Not on file. Social History Main Topics    Smoking status: Never Smoker    Smokeless tobacco: Never Used    Alcohol use No    Drug use: No    Sexual activity: Not Currently     Partners: Male     Other Topics Concern    Not on file     Social History Narrative    Lives alone         ALLERGIES: Haldol [haloperidol lactate]    Review of Systems   Constitutional: Negative for activity change, appetite change, chills and fever. HENT: Positive for congestion, nosebleeds and rhinorrhea. Negative for sinus pressure, sneezing and sore throat. Eyes: Negative for pain, discharge and visual disturbance. Respiratory: Negative for cough and shortness of breath. Cardiovascular: Negative for chest pain. Gastrointestinal: Negative for abdominal pain, diarrhea, nausea and vomiting. Genitourinary: Negative for dysuria, flank pain, frequency and urgency. Musculoskeletal: Positive for arthralgias and joint swelling. Negative for back pain, gait problem, myalgias and neck pain. Skin: Negative for color change and rash. Neurological: Negative for dizziness, speech difficulty, weakness, light-headedness, numbness and headaches. Psychiatric/Behavioral: Negative for agitation, behavioral problems and confusion. All other systems reviewed and are negative.       Vitals:    03/04/17 2025   BP: 144/87   Pulse: 100   Resp: 16   Temp: 97.7 °F (36.5 °C)   SpO2: 94%   Weight: 114.3 kg (252 lb)   Height: 5' 5\" (1.651 m) Physical Exam   Constitutional: She is oriented to person, place, and time. She appears well-developed and well-nourished. No distress. HENT:   Head: Normocephalic and atraumatic. Right Ear: External ear normal.   Left Ear: External ear normal.   Nose: Mucosal edema and rhinorrhea present. No sinus tenderness. Epistaxis (dried crusting blood ) is observed. Mouth/Throat: Oropharynx is clear and moist. No oropharyngeal exudate. Eyes: Conjunctivae and EOM are normal. Pupils are equal, round, and reactive to light. Neck: Normal range of motion. Neck supple. Cardiovascular: Normal rate, regular rhythm, normal heart sounds and intact distal pulses. Pulmonary/Chest: Effort normal and breath sounds normal.   Abdominal: Soft. Bowel sounds are normal. There is no tenderness. There is no rebound and no guarding. Musculoskeletal: Normal range of motion. Left hand: She exhibits tenderness, bony tenderness and swelling. She exhibits normal range of motion, normal capillary refill, no deformity and no laceration. Left foot: There is tenderness, bony tenderness and swelling. There is normal range of motion and normal capillary refill. Neurological: She is alert and oriented to person, place, and time. Skin: Skin is warm and dry. Psychiatric: She has a normal mood and affect. Her behavior is normal. Judgment and thought content normal.   Nursing note and vitals reviewed. MDM  Number of Diagnoses or Management Options  Falls frequently:   Foot fracture, left, closed, initial encounter:   Gait disturbance:   History of epistaxis:   Left hand pain:   Diagnosis management comments: DDx: Sprain/Fx/Contusion/ Epistaxis/ Seasonal Allergies/ URI     76 yo F with c/o L foot/hand pain x 1 days; epistaxis and congestion x 1 week. (+) fx on foot x-ray. Advised podiatry f/u, ice at home, and NSAIDs for pain relief. No nose bleed on my evaluation. Noted to be congested. Possible seasonal allergies. Advised ENT f/u for ongoing nose bleeds. Amount and/or Complexity of Data Reviewed  Tests in the radiology section of CPT®: ordered and reviewed  Review and summarize past medical records: yes      ED Course       Procedures    LABORATORY TESTS:  No results found for this or any previous visit (from the past 12 hour(s)). IMAGING RESULTS:  XR HAND LT MIN 3 V   Final Result   Study Result      EXAM: XR HAND LT MIN 3 V     INDICATION: Left hand pain since injury during a fall today. .     COMPARISON: None.     FINDINGS: Three views of the left hand demonstrate osteopenia. No acute  fracture or dislocation. Joints are within normal limits.     IMPRESSION  IMPRESSION: No acute abnormality. XR FOOT LT MIN 3 V   Final Result   EXAM: XR FOOT LT MIN 3 V     INDICATION: Left foot pain after trip and fall injury earlier today.     COMPARISON: None.     FINDINGS: Three views of the left foot demonstrate nondisplaced comminuted  fractures of the fourth and fifth metatarsals distal diaphysis. No evidence of  intra-articular extension. Bones are osteopenic. First MTP joint osteoarthritis  is not significantly changed. Surgical anchor in the distal fibula is  unchanged. . Mild soft tissue swelling.     IMPRESSION  IMPRESSION:      Nondisplaced fourth and fifth metatarsal diaphyseal fractures are acute. MEDICATIONS GIVEN:  Medications   ibuprofen (MOTRIN) tablet 800 mg (800 mg Oral Given 3/4/17 2150)   traMADol (ULTRAM) tablet 100 mg (100 mg Oral Given 3/4/17 2150)       IMPRESSION:  1. Foot fracture, left, closed, initial encounter    2. Gait disturbance    3. Falls frequently    4. Left hand pain    5. History of epistaxis        PLAN:  1.    Discharge Medication List as of 3/4/2017 10:25 PM      START taking these medications    Details   ibuprofen (MOTRIN) 800 mg tablet Take 1 Tab by mouth every eight (8) hours as needed for Pain., Print, Disp-20 Tab, R-0      HYDROcodone-acetaminophen (NORCO) 5-325 mg per tablet Take 1 Tab by mouth every six (6) hours as needed for Pain. Max Daily Amount: 4 Tabs., Print, Disp-8 Tab, R-0      oxymetazoline (AFRIN SINUS, OXYMETAZOLINE,) 0.05 % nasal spray 2 Sprays by Both Nostrils route two (2) times a day for 3 days. , Print, Disp-1 Each, R-0      fexofenadine (ALLEGRA) 60 mg tablet Take 0.5 Tabs by mouth daily. , Print, Disp-30 Tab, R-0         CONTINUE these medications which have NOT CHANGED    Details   polyethylene glycol (MIRALAX) 17 gram/dose powder Take 8.5 g by mouth daily. , Normal, Disp-850 g, R-5      potassium chloride (KAON 20%) 40 mEq/15 mL liqd take 7.5 milliliters by mouth once daily for HYPOKALEMIA PREVENTION, NormalPlease discontinue potassium tablets. This replaces. Disp-480 mL, R-1      divalproex DR (DEPAKOTE) 500 mg tablet Take 1 Tab by mouth two (2) times a day., Historical Med, Disp-180 Tab, R-1      inhalational spacing device 1 Each by Does Not Apply route as needed., NormalPlease provided spacer per formulary brand. Disp-1 Device, R-0      baclofen (LIORESAL) 10 mg tablet take 1/2 tablet by mouth twice a day, Normal, Disp-90 Tab, R-1      albuterol (PROAIR HFA) 90 mcg/actuation inhaler Take 1 Puff by inhalation every four (4) hours as needed. Indications: BRONCHOSPASM PREVENTION, Print, Disp-1 Inhaler, R-0      benztropine (COGENTIN) 1 mg tablet Take 1 mg by mouth two (2) times a day., Historical Med, R-0      anastrozole (ARIMIDEX) 1 mg tablet take 1 tablet by mouth once daily, Normal, Disp-90 Tab, R-0      FLUoxetine (PROZAC) 10 mg capsule TAKE ONE CAPSULE BY MOUTH EVERY DAY, Normal, Disp-90 Cap, R-0      risperiDONE (RISPERDAL) 2 mg tablet Take 1 Tab by mouth two (2) times a day., Normal, Disp-180 Tab, R-0      aspirin delayed-release 81 mg tablet Take 1 Tab by mouth daily. , OTC, Disp-30 Tab, R-3      butalbital-acetaminophen-caffeine (FIORICET, ESGIC) -40 mg per tablet Take 1 Tab by mouth every four (4) hours as needed for Headache., Print, Disp-60 Tab, R-3      gabapentin (NEURONTIN) 300 mg capsule Take 1 Cap by mouth two (2) times a day., Normal, Disp-180 Cap, R-0           2. Follow-up Information     Follow up With Details Comments Contact Info    Jac Carrel, DPM Schedule an appointment as soon as possible for a visit Call Monday to make an appointment for your foot fracture  [address]   [address] [facility] [address]      [place] [address] [facility] [address] DEP Go to As needed, If symptoms worsen Ana Rios [address] [facility] Schedule an appointment as soon as possible for a visit Call regarding your nose bleeds  [address] [facility]  [place] [address] Kirill Bartholomew MD Schedule an appointment as soon as possible for a visit  [address]  [phone]          3. Return to ED if worse     Discharge Note:    The patient is ready for discharge. The patient's signs, symptoms, diagnosis, and discharge instruction have been discussed and the patient has conveyed their understanding. The patient is to follow up as recommended or return to the ER should their symptoms worsen. Plan has been discussed and the patient is in agreement.     Kenneth Euceda NP

## 2017-03-05 NOTE — ED TRIAGE NOTES
TRIAGE NOTE: Patient reports tripping while using rolator and falling. Patient injured left hand and left foot. Patient also reports right sided nose bleed off and on for a few days, not currently bleeding.

## 2017-03-05 NOTE — ED NOTES
Patient and her son given verbal and written discharge instructions. Patient expressed understanding of these instructions. Patient was discharged to home in the care of her son to follow up with her PCP.

## 2017-03-05 NOTE — DISCHARGE INSTRUCTIONS
We hope that we have addressed all of your medical concerns. The examination and treatment you received in the Emergency Department were for an emergent problem and were not intended as complete care. It is important that you follow up with your healthcare provider(s) for ongoing care. If your symptoms worsen or do not improve as expected, and you are unable to reach your usual health care provider(s), you should return to the Emergency Department. Today's healthcare is undergoing tremendous change, and patient satisfaction surveys are one of the many tools to assess the quality of medical care. You may receive a survey from the [employer] regarding your experience in the Emergency Department. I hope that your experience has been completely positive, particularly the medical care that I provided. As such, please participate in the survey; anything less than excellent does not meet my expectations or intentions. [facility] and [address] [place] [address] participate in nationally recognized quality of care measures. If your blood pressure is greater than 120/80, as reported below, we urge that you seek medical care to address the potential of high blood pressure, commonly known as hypertension. Hypertension can be hereditary or can be caused by certain medical conditions, pain, stress, or \"white coat syndrome. \"       Please make an appointment with your health care provider(s) for follow up of your Emergency Department visit. VITALS:   Patient Vitals for the past 8 hrs:   Temp Pulse Resp BP SpO2   03/04/17 2025 97.7 °F (36.5 °C) 100 16 144/87 94 %          Thank you for allowing us to provide you with medical care today. We realize that you have many choices for your emergency care needs. Please choose us in the future for any continued health care needs. Mylene Mendes.   Office: [phone]            No results found for this or any previous visit (from the past 24 hour(s)). Xr Hand Lt Min 3 V    Result Date: 3/4/2017  EXAM:  XR HAND LT MIN 3 V INDICATION:  Left hand pain since injury during a fall today. . COMPARISON: None. FINDINGS: Three views of the left hand demonstrate osteopenia. No acute fracture or dislocation. Joints are within normal limits. IMPRESSION:  No acute abnormality. Xr Foot Lt Min 3 V    Result Date: 3/4/2017  EXAM:  XR FOOT LT MIN 3 V INDICATION:   Left foot pain after trip and fall injury earlier today. COMPARISON:  None. FINDINGS:  Three views of the left foot demonstrate nondisplaced comminuted fractures of the fourth and fifth metatarsals distal diaphysis. No evidence of intra-articular extension. Bones are osteopenic. 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 Surgical anchor in the distal fibula is unchanged. .  Mild soft tissue swelling. IMPRESSION:  Nondisplaced fourth and fifth metatarsal diaphyseal fractures are acute.

## 2017-03-06 NOTE — TELEPHONE ENCOUNTER
Please confirm patient has a follow-up appointment scheduled with orthopedics. She was diagnosed with a left foot fracture in the ER on 3/4/2017.      If she does not, I would recommend she call Jessy Samuel for follow-up of foot fracture: [phone]    Thanks  Yaw Farmer MD

## 2017-03-06 NOTE — TELEPHONE ENCOUNTER
Pt states she has contacted ortho, but was awaiting a call back to schedule. Advised to contact them again for an appt and provided [facility] phone number. She confirmed.

## 2017-03-07 NOTE — CALL BACK NOTE
[facility] Emergency Department Follow Up Call Record    Discharged to : Home/Family Home/Home Health/Skilled Facility/Rehab/Assisted Living/Other__home _____  1) Did you receive your discharge instructions? Yes        2) Do you understand them? Yes    Spoke with [facility] as well as her caregiver, Pollo Betts. Both state appointment made with Podiatry next week. Encouraged follow up with PCP as well. [facility] states she has mis placed her discharge instructions, so reviewed those with caretaker Pollo Betts with physician numbers to contact as well. Will send instructions to [facility] mailing address. Currently Madeleine Lefort wearing a post op boot on her left foot for support. 3) Are you able to follow them? Yes          If NO, what can I clarify for you? 4) Do you understand your diagnosis? Yes         5) Do you know which symptoms should prompt you to call the doctor? Yes     6) Were you able to fill and  any medications that were prescribed? Yes     7) You were prescribed _allegra, Afrin, hydrocodone, motrin __________for nasal congestion and bleeding and foot pain____________________. Common side effects of this medication are__rash, headaches __________________. This is not a complete list so please review the forms given from the pharmacy for a complete list.      8) Are there any questions about your medications? No            Have you scheduled any recommended doctors appointments (specialty, PCP) YES. Appointment made with Podiatry. If NO, what barriers are you encountering (transportation/lost contact info/cost/  didnt think necessary/no PCP  9) If discharged with Home Health, has the agency contacted you to schedule visit? No  10) Is there anyone available to help you at home (meals, errands, transportation    monitoring) (adult children, neighbors, private duty companions) No    11) Are you on a special diet?  No         If YES, do you understand the requirements for this diet?       Education provided? 12) If presented with cough, bronchitis, COPD, asthma, is it ok to ask that the   respiratory disease management educator call you? No      13)  A) If presented with fall, were you issued an assistive device in the ED    Are you using? Uses own walker  B) If given RX for device, have you obtained? Not applicable       If NO, barriers? C) Therapist recommended: NO   Are you able to implement the suggestions? Not applicable        If NO, barriers to implementation? D) Are you having any difficulties with mobility inside your home?     (steps, bed, tub) YES  Hx of falls. Caretaker, Lady Alvarez states that Wm. Ibis Sifuentes walks safely when using her walker. If YES, ask if the [facility] PT can contact patient and good time and number?  14)  At the end of your discharge instructions, there is information about accessing [address], have you had a chance to review those? No         Do you have any questions about signing up for this service? NO   We encourage our patients to be active participants in their healthcare and this site is one of the ways to do that. It will allow you to access parts of your medical record, email your doctors office, schedule appointments, and request medications refills . 15) Are there any other questions that I can answer for you regarding    your Emergency department visit?  NO             Estimated Call Time:____11:37 AM  _______________ Date/Time:_______________

## 2017-03-19 NOTE — TELEPHONE ENCOUNTER
Results of Sleep Testing, PAP titration and follow-up discussed with patient. Patient encouraged to call if there were any further questions regarding sleep symptoms. Encounter Diagnosis   Name Primary?     JULIAN (obstructive sleep apnea) Yes       Orders Placed This Encounter    SLEEP LAB (PAP TITRATION)     Standing Status:   Future     Standing Expiration Date:   9/17/2017     Order Specific Question:   Reason for Exam     Answer:   JULIAN

## 2017-04-07 NOTE — TELEPHONE ENCOUNTER
Patient called to verify her appointment for 4/14 (sleep study at 9.30). She said she will try to arrange for transportation. If she is not able to arrange, then she will call us back.

## 2017-04-18 NOTE — TELEPHONE ENCOUNTER
Orders Placed This Encounter    AMB SUPPLY ORDER     Diagnosis: Sleep Apnea ICD-10 Code (G47.30); ICD-9 Code (780.57). Positive Airway Pressure Therapy: Duration of need: 99 months. ResMed VPAP S (Spontaneous Mode):  IPAP: 14 cmH2O; Minimum EPAP: 08 cmH2O. Ramp Time: 30 Minutes; Easy Breathe: On.    CPAP mask -  As fitted during titration OR patient preference, headgear, tubing, and filter;  heated humidifier; wireless modem. Remote monitoring enrollment. Moriah Pardo MD, FAASM; NPI: [id number]  Electronically signed.  April 18, 2017

## 2017-04-19 NOTE — TELEPHONE ENCOUNTER
Dr. Mattson Charlotte / Rx   Received:  Today       [facility]       Phone Number: [phone]                     Pt needs to speak with  about medication.

## 2017-04-19 NOTE — TELEPHONE ENCOUNTER
Please remind patient we discussed this during her visit and I will not be prescribing a narcotic medication for her chronic stomach (on oxycodone from orthopedic doctor). Okay to take limited amount of OTC tylenol.      Tari Moreno MD

## 2017-04-19 NOTE — MR AVS SNAPSHOT
Visit Information Date & Time Provider Department Dept. Phone Encounter #  
 4/19/2017 10:00 AM Joce Lara MD [facility] [phone] [id number] Follow-up Instructions Return in about 3 months (around 7/26/2017) for medicare wellness. Follow-up weight. Magaliy Setting Your Appointments 5/8/2017  2:40 PM  
Follow Up with MD [facility] Appt Note: follow up  seizures cl  
 [address] [facility] [address]  
[phone]  
  
   
 [address] [address]  
  
    
 7/20/2017  2:00 PM  
Any with Murtaza Spicer MD  
[address] [place] [address] ([facility]) Appt Note: yrly cpap follow up [address] [facility] [address]  
  
   
 [address] [facility] [address] [place] [phone] Upcoming Health Maintenance Date Due DTaP/Tdap/Td series (1 - Tdap) 8/15/1971 GLAUCOMA SCREENING Q2Y 8/15/2015 FOBT Q 1 YEAR AGE 50-75 3/25/2016 MEDICARE YEARLY EXAM 7/22/2017 BREAST CANCER SCRN MAMMOGRAM 8/12/2018 Pneumococcal 65+ High/Highest Risk (2 of 2 - PPSV23) 6/1/2019 Allergies as of 4/19/2017  Review Complete On: 4/19/2017 By: Trung Baltazar Severity Noted Reaction Type Reactions Haldol [Haloperidol Lactate]  08/26/2014    Other (comments) Current Immunizations  Reviewed on 3/11/2015 Name Date Influenza High Dose Vaccine PF 8/22/2016 Influenza Vaccine 10/15/2014 Influenza Vaccine Split 10/13/2010 Pneumococcal Conjugate (PCV-13) 7/21/2016 Pneumococcal Vaccine (Unspecified Type) 6/1/2014 Zoster Vaccine, Live 8/22/2016 Not reviewed this visit You Were Diagnosed With   
  
 Codes Comments Essential hypertension    -  Primary ICD-10-CM: I10 
ICD-9-CM: 401.9  Screening for lipid disorders     ICD-10-CM: Z13.220 
 ICD-9-CM: V77.91 Schizoaffective disorder, unspecified type ([facility]     ICD-10-CM: F25.9 ICD-9-CM: 295.70 Chronic obstructive pulmonary disease, unspecified COPD type ([facility].)     ICD-10-CM: J44.9 ICD-9-CM: 348 Chronic abdominal pain     ICD-10-CM: R10.9, G89.29 ICD-9-CM: 789.00, 338.29 Vitals BP Pulse Temp Resp Height(growth percentile) Weight(growth percentile) 135/86 86 97.3 °F (36.3 °C) (Oral) 20 5' 5\" (1.651 m) 257 lb (116.6 kg) SpO2 BMI OB Status Smoking Status 94% 42.77 kg/m2 Hysterectomy Never Smoker Vitals History BMI and BSA Data Body Mass Index Body Surface Area 42.77 kg/m 2 2.31 m 2 Preferred Pharmacy Pharmacy Name [facility] [address] [place] [address] [phone] Your Updated Medication List  
  
   
This list is accurate as of: 4/19/17 11:28 AM.  Always use your most recent med list.  
  
  
  
  
 albuterol 90 mcg/actuation inhaler Commonly known as:  PROAIR HFA Take 1 Puff by inhalation every four (4) hours as needed. Indications: BRONCHOSPASM PREVENTION  
  
 anastrozole 1 mg tablet Commonly known as:  ARIMIDEX  
take 1 tablet by mouth once daily  
  
 aspirin delayed-release 81 mg tablet Take 1 Tab by mouth daily. baclofen 10 mg tablet Commonly known as:  LIORESAL  
take 1/2 tablet by mouth twice a day  
  
 benztropine 1 mg tablet Commonly known as:  COGENTIN Take 1 mg by mouth two (2) times a day. butalbital-acetaminophen-caffeine -40 mg per tablet Commonly known as:  Yue Ballard Take 1 Tab by mouth every four (4) hours as needed for Headache. DEPAKOTE 500 mg tablet Generic drug:  divalproex DR Take 1 Tab by mouth two (2) times a day. fexofenadine 60 mg tablet Commonly known as:  New Ronda Take 0.5 Tabs by mouth daily. FLUoxetine 10 mg capsule Commonly known as:  PROzac TAKE ONE CAPSULE BY MOUTH EVERY DAY  
  
 gabapentin 300 mg capsule Commonly known as:  NEURONTIN Take 1 Cap by mouth two (2) times a day. HYDROcodone-acetaminophen 5-325 mg per tablet Commonly known as:  Jerl Ards Take 1 Tab by mouth every six (6) hours as needed for Pain. Max Daily Amount: 4 Tabs. ibuprofen 800 mg tablet Commonly known as:  MOTRIN Take 1 Tab by mouth every eight (8) hours as needed for Pain.  
  
 inhalational spacing device 1 Each by Does Not Apply route as needed. miconazole 2 % topical cream  
Commonly known as:  Layvonne Bobby Apply  to affected area two (2) times a day. polyethylene glycol 17 gram/dose powder Commonly known as:  Laure Buxton Take 8.5 g by mouth daily. potassium chloride 40 mEq/15 mL Liqd Commonly known as:  KAON 20%  
take 7.5 milliliters by mouth once daily for HYPOKALEMIA PREVENTION  
  
 risperiDONE 2 mg tablet Commonly known as:  RisperDAL Take 1 Tab by mouth two (2) times a day. Prescriptions Sent to Pharmacy Refills  
 miconazole (MICOTIN) 2 % topical cream 0 Sig: Apply  to affected area two (2) times a day. Class: Normal  
 Pharmacy: [address] [facility], [address] [place] [address] Ph #: [phone] Route: Topical  
  
We Performed the Following LIPID PANEL [67690 CPT(R)] METABOLIC PANEL, COMPREHENSIVE [99537 CPT(R)] Follow-up Instructions Return in about 3 months (around 7/26/2017) for medicare wellness. Follow-up weight. Ariella Thapa Patient Instructions - Stop drinking [address] tea. Continue to drink mainly water. - Continue to stay active, going to gym. Continue to use wheeled walker at this time. - try and eat your main meal of the day at lunch, eat a small meal at dinner. Learning About Cutting Calories How do calories affect your weight? Food gives your body energy. Energy from the food you eat is measured in calories.  This energy keeps your heart beating, your brain active, and your muscles working. Your body needs a certain number of calories each day. After your body uses the calories it needs, it stores extra calories as fat. To lose weight safely, you have to eat fewer calories while eating in a healthy way. How many calories do you need each day? The more active you are, the more calories you need. When you are less active, you need fewer calories. How many calories you need each day also depends on several things, including your age and whether you are male or female. Here are some general guidelines for adults: 
· Less active women and older adults need 1,600 to 2,000 calories each day. · Active women and less active men need 2,000 to 2,400 calories each day. · Active men need 2,400 to 3,000 calories each day. How can you cut calories and eat healthy meals? Whole grains, vegetables and fruits, and dried beans are good lower-calorie foods. They give you lots of nutrients and fiber. And they fill you up. Sweets, energy drinks, and soda pop are high in calories. They give you few nutrients and no fiber. Try to limit soda pop, fruit juice, and energy drinks. Drink water instead. Some fats can be part of a healthy diet. But cutting back on fats from highly processed foods like fast foods and many snack foods is a good way to lower the calories in your diet. Also, use smaller amounts of fats like butter, margarine, salad dressing, and mayonnaise. Add fresh garlic, lemon, or herbs to your meals to add flavor without adding fat. Meats and dairy products can be a big source of hidden fats. Try to choose lean or low-fat versions of these products. Fat-free cookies, candies, chips, and frozen treats can still be high in sugar and calories. Some fat-free foods have more calories than regular ones. Eat fat-free treats in moderation, as you would other foods.  
If your favorite foods are high in fat, salt, sugar, or calories, limit how often you eat them. Eat smaller servings, or look for healthy substitutes. Fill up on fruits, vegetables, and whole grains. Eating at home · Use meat as a side dish instead of as the main part of your meal. 
· Try main dishes that use whole wheat pasta, brown rice, dried beans, or vegetables. · Find ways to cook with little or no fat, such as broiling, steaming, or grilling. · Use cooking spray instead of oil. If you use oil, use a monounsaturated oil, such as canola or olive oil. · Trim fat from meats before you cook them. · Drain off fat after you brown the meat or while you roast it. · Chill soups and stews after you cook them. Then skim the fat off the top after it hardens. Eating out · Order foods that are broiled or poached rather than fried or breaded. · Cut back on the amount of butter or margarine that you use on bread. · Order sauces, gravies, and salad dressings on the side, and use only a little. · When you order pasta, choose tomato sauce rather than cream sauce. · Ask for salsa with your baked potato instead of sour cream, butter, cheese, or guzman. · Order meals in a small size instead of upgrading to a large. · Share an entree, or take part of your food home to eat as another meal. 
· Share appetizers and desserts. Where can you learn more? Go to http://rose-jaiden.info/. Enter 99 397898 in the search box to learn more about \"Learning About Cutting Calories. \" Current as of: November 9, 2016 Content Version: 11.2 © 1695-0863 [employer]. Care instructions adapted under license by [employer] (which disclaims liability or warranty for this information). If you have questions about a medical condition or this instruction, always ask your healthcare professional. Cristofer Mcdonald any warranty or liability for your use of this information. Introducing [facility]! Dear Maritza Dean: Thank you for requesting a Starr[employer] account. Our records indicate that you already have an active Starriser account. You can access your account anytime at [email or web address] Did you know that you can access your hospital and ER discharge instructions at any time in Starriser? You can also review all of your test results from your hospital stay or ER visit. Additional Information If you have questions, please visit the Frequently Asked Questions section of the [employer] website at [email or web address] Remember, Starriser is NOT to be used for urgent needs. For medical emergencies, dial 911. Now available from your iPhone and Android! Please provide this summary of care documentation to your next provider. Your primary care clinician is listed as Behzad Kelley. If you have any questions after today's visit, please call [phone].

## 2017-04-19 NOTE — PATIENT INSTRUCTIONS
- Stop drinking [address] tea. Continue to drink mainly water. - Continue to stay active, going to gym. Continue to use wheeled walker at this time. - try and eat your main meal of the day at lunch, eat a small meal at dinner. Learning About Cutting Calories  How do calories affect your weight? Food gives your body energy. Energy from the food you eat is measured in calories. This energy keeps your heart beating, your brain active, and your muscles working. Your body needs a certain number of calories each day. After your body uses the calories it needs, it stores extra calories as fat. To lose weight safely, you have to eat fewer calories while eating in a healthy way. How many calories do you need each day? The more active you are, the more calories you need. When you are less active, you need fewer calories. How many calories you need each day also depends on several things, including your age and whether you are male or female. Here are some general guidelines for adults:  · Less active women and older adults need 1,600 to 2,000 calories each day. · Active women and less active men need 2,000 to 2,400 calories each day. · Active men need 2,400 to 3,000 calories each day. How can you cut calories and eat healthy meals? Whole grains, vegetables and fruits, and dried beans are good lower-calorie foods. They give you lots of nutrients and fiber. And they fill you up. Sweets, energy drinks, and soda pop are high in calories. They give you few nutrients and no fiber. Try to limit soda pop, fruit juice, and energy drinks. Drink water instead. Some fats can be part of a healthy diet. But cutting back on fats from highly processed foods like fast foods and many snack foods is a good way to lower the calories in your diet. Also, use smaller amounts of fats like butter, margarine, salad dressing, and mayonnaise. Add fresh garlic, lemon, or herbs to your meals to add flavor without adding fat.   Meats and dairy products can be a big source of hidden fats. Try to choose lean or low-fat versions of these products. Fat-free cookies, candies, chips, and frozen treats can still be high in sugar and calories. Some fat-free foods have more calories than regular ones. Eat fat-free treats in moderation, as you would other foods. If your favorite foods are high in fat, salt, sugar, or calories, limit how often you eat them. Eat smaller servings, or look for healthy substitutes. Fill up on fruits, vegetables, and whole grains. Eating at home  · Use meat as a side dish instead of as the main part of your meal.  · Try main dishes that use whole wheat pasta, brown rice, dried beans, or vegetables. · Find ways to cook with little or no fat, such as broiling, steaming, or grilling. · Use cooking spray instead of oil. If you use oil, use a monounsaturated oil, such as canola or olive oil. · Trim fat from meats before you cook them. · Drain off fat after you brown the meat or while you roast it. · Chill soups and stews after you cook them. Then skim the fat off the top after it hardens. Eating out  · Order foods that are broiled or poached rather than fried or breaded. · Cut back on the amount of butter or margarine that you use on bread. · Order sauces, gravies, and salad dressings on the side, and use only a little. · When you order pasta, choose tomato sauce rather than cream sauce. · Ask for salsa with your baked potato instead of sour cream, butter, cheese, or guzman. · Order meals in a small size instead of upgrading to a large. · Share an entree, or take part of your food home to eat as another meal.  · Share appetizers and desserts. Where can you learn more? Go to http://rose-jaiden.info/. Enter 99 432651 in the search box to learn more about \"Learning About Cutting Calories. \"  Current as of: November 9, 2016  Content Version: 11.2  © 7652-0802 [employer], [facility].  Care instructions adapted under license by [employer] (which disclaims liability or warranty for this information). If you have questions about a medical condition or this instruction, always ask your healthcare professional. [address] any warranty or liability for your use of this information.

## 2017-04-19 NOTE — TELEPHONE ENCOUNTER
Called patient to verify the name of the medication. Per patient she does not know the name but know it is for pain.   # [id number]

## 2017-04-19 NOTE — PROGRESS NOTES
HPI   Gaby Moore is a 77 y.o. female, she presents today for:    History of broken toes on foot. Has an appt on 4/24 Dr. Johan Gamboa (orthopedic). Took pain medication. Still doesn't have cpap at home. Working with sleep medicine clinic. Numbness in hands, it is not new, \" i have yovany having it for a while\". Sores on fingers for 3 days. (ithcing). Drinking lactaid milk. Drinking [place] 2 times a day. PMH/PSH: reviewed and updated  Sochx:  reports that she has never smoked. She has never used smokeless tobacco. She reports that she does not drink alcohol or use illicit drugs. Famhx: reviewed and updated     All: Allergies   Allergen Reactions    Haldol [Haloperidol Lactate] Other (comments)     Med:   Current Outpatient Prescriptions   Medication Sig    baclofen (LIORESAL) 10 mg tablet take 1/2 tablet by mouth twice a day    fexofenadine (ALLEGRA) 60 mg tablet Take 0.5 Tabs by mouth daily.  potassium chloride (KAON 20%) 40 mEq/15 mL liqd take 7.5 milliliters by mouth once daily for HYPOKALEMIA PREVENTION    divalproex DR (DEPAKOTE) 500 mg tablet Take 1 Tab by mouth two (2) times a day.  inhalational spacing device 1 Each by Does Not Apply route as needed.  albuterol (PROAIR HFA) 90 mcg/actuation inhaler Take 1 Puff by inhalation every four (4) hours as needed. Indications: BRONCHOSPASM PREVENTION    benztropine (COGENTIN) 1 mg tablet Take 1 mg by mouth two (2) times a day.  anastrozole (ARIMIDEX) 1 mg tablet take 1 tablet by mouth once daily    FLUoxetine (PROZAC) 10 mg capsule TAKE ONE CAPSULE BY MOUTH EVERY DAY    risperiDONE (RISPERDAL) 2 mg tablet Take 1 Tab by mouth two (2) times a day.  aspirin delayed-release 81 mg tablet Take 1 Tab by mouth daily.  butalbital-acetaminophen-caffeine (FIORICET, ESGIC) -40 mg per tablet Take 1 Tab by mouth every four (4) hours as needed for Headache.     gabapentin (NEURONTIN) 300 mg capsule Take 1 Cap by mouth two (2) times a day.    ibuprofen (MOTRIN) 800 mg tablet Take 1 Tab by mouth every eight (8) hours as needed for Pain.  HYDROcodone-acetaminophen (NORCO) 5-325 mg per tablet Take 1 Tab by mouth every six (6) hours as needed for Pain. Max Daily Amount: 4 Tabs.  polyethylene glycol (MIRALAX) 17 gram/dose powder Take 8.5 g by mouth daily. No current facility-administered medications for this visit. Review of Systems   Constitutional: Negative for chills, fever and weight loss. HENT: Negative for congestion. Respiratory: Negative for cough, shortness of breath and wheezing. Cardiovascular: Negative for chest pain and leg swelling. Gastrointestinal: Positive for abdominal pain. Negative for nausea. Genitourinary: Negative for dysuria. Skin: Negative for rash. Neurological: Negative for dizziness and headaches. PE:  Blood pressure 135/86, pulse 86, temperature 97.3 °F (36.3 °C), temperature source Oral, resp. rate 20, height 5' 5\" (1.651 m), weight 257 lb (116.6 kg), SpO2 94 %. Body mass index is 42.77 kg/(m^2). Physical Exam   Constitutional: She is oriented to person, place, and time. She appears well-developed. No distress. HENT:   Head: Normocephalic. Mouth/Throat: Oropharynx is clear and moist.   Eyes: Conjunctivae are normal. Pupils are equal, round, and reactive to light. Neck: Neck supple. No thyromegaly present. Cardiovascular: Normal rate and regular rhythm. Pulmonary/Chest: Effort normal.   Abdominal: Soft. She exhibits no distension. Lymphadenopathy:     She has no cervical adenopathy. Neurological: She is alert and oriented to person, place, and time. Skin: No rash noted. + exfoliation in webbing of fingers right hand more than left. No rash at wrist nor elbows. No papules seen. Nursing note and vitals reviewed. Labs:   No results found for any visits on 04/19/17. A/P:  77 y.o. female    ICD-10-CM ICD-9-CM    1.  Essential hypertension M94 388.7 METABOLIC PANEL, COMPREHENSIVE   2. Screening for lipid disorders Z13.220 V77.91 LIPID PANEL   3. Schizoaffective disorder, unspecified type (HCC) F25.9 295.70 miconazole (MICOTIN) 2 % topical cream   4. Chronic obstructive pulmonary disease, unspecified COPD type ([facility] J44.9 496    5. Chronic abdominal pain R52.9 358.75 METABOLIC PANEL, COMPREHENSIVE    G89.29 338.29        COPD: well controlled. Continue to work on breathing exercises/pulmonary toilet. Sleep apnea: seen in sleep medicine clinic. Continues to work to get  cpap back.      HTN: BP borderline controlled today. Encouarged increased exercise and calorie restriction for weight loss. Hepatic function and metabolic profile ordered for medication monitoring. Tinea corporis: treat with topical miconazole. To call back if itching increases in case this is in fact scabies given location. However does not appear like scabies on exam.      Follow-up Disposition:  Return in about 3 months (around 7/26/2017) for medicare wellness. Follow-up weight. .  Future Appointments  Date Time Provider Lisandro Connolly   5/8/2017 2:40 PM MD REMBERTO Mckinney/ Shanique Lundy   7/20/2017 2:00 PM Luiz Blount MD JACKIE [facility] [address]

## 2017-05-08 NOTE — TELEPHONE ENCOUNTER
Patient request to speak with the nurse regarding miconazole 2 % topical cream. Per pt, the cream has alcohol in it and has burned her hands. Patient would like for an alternative cream called in for her.     # [phone]

## 2017-05-08 NOTE — TELEPHONE ENCOUNTER
The cream was given ~ 3 weeks ago for possible fungal infection on the hands. Did she use it? Did the rash change or get better? I will send in an ointment form.  But if not improving we may need to change therapeutic approach  Lawrence Justice MD

## 2017-05-08 NOTE — PATIENT INSTRUCTIONS
[facility]   Statement to Patients  April 1, 2014      In an effort to ensure the large volume of patient prescription refills is processed in the most efficient and expeditious manner, we are asking our patients to assist us by calling your Pharmacy for all prescription refills, this will include also your  Mail Order Pharmacy. The pharmacy will contact our office electronically to continue the refill process. Please do not wait until the last minute to call your pharmacy. We need at least 48 hours (2days) to fill prescriptions. We also encourage you to call your pharmacy before going to  your prescription to make sure it is ready. With regard to controlled substance prescription refill requests (narcotic refills) that need to be picked up at our office, we ask your cooperation by providing us with at least 72 hours (3days) notice that you will need a refill. We will not refill narcotic prescription refill requests after 4:00pm on any weekday, Monday through Thursday, or after 2:00pm on Fridays, or on the weekends. We encourage everyone to explore another way of getting your prescription refill request processed using [employer], our patient web portal through our electronic medical record system. [employer] is an efficient and effective way to communicate your medication request directly to the office and  downloadable as an yomi on your smart phone . [employer] also features a review functionality that allows you to view your medication list as well as leave messages for your physician. Are you ready to get connected? If so please review the attatched instructions or speak to any of our staff to get you set up right away! Thank you so much for your cooperation. Should you have any questions please contact our Practice Administrator.     The Physicians and Staff,  [facility]           Please bring all medication bottles, including vitamins, supplements and any over-the-counter medications, to your next office visit.

## 2017-05-08 NOTE — PROGRESS NOTES
Neurology Progress Note    HISTORY PROVIDED BY: patient    Chief Complaint:   Chief Complaint   Patient presents with    Seizure     no seizures since last office visit    Gait Problem     feels unsteady      Subjective:   Pt is a 77 y.o. 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 Exam with stooped posture, mild orobuccal dyskinesias, right resting tremor, steady gait with walker with normal stride. Involuntary movements appeared  Improved at last visit, possibly secondary to cogentin. Continued Depakote 500mg bid, Rx sent to pharmacy. She returns for planned f/u. Pt reports no seizures since last visit. Depakote 500mg bid. Has new c/o feeling unsteady on her feet, for almost a year and a half. She has being a rollator for a long time. Feet feel numb most of the time, but numbness may go all the way up both legs to her buttocks. She is on gabapentin for stabbing pain in feet, which helps. States that she cannot walk or even stand without her rollator. Had a CT L-spine 7/24/15 with multilevel degenerative changes and severe central and NF stenosis. NCS/EMG 1/4/16 reported as revealing peripheral polyneuropathy, unclear etiology, possibly due to chemotherapy. Labs- 4/19/17 CMP- unremarkable, 1/19/17 CBC-unremarkable.      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     Fall 2/2/2015    Gait disturbance     Hyperlipidemia     Hypertension     Infiltrating ductal carcinoma of breast ([address]     s/p partial right mastectomy 4/21/11 s/p XRT and chemotherapy    Peripheral neuropathy ([address]     NCS/EMG 1/4/16 - peripheral polyneuropathy    Psychiatric disorder     Depression    Rhabdomyolysis     Schizoaffective disorder     Sepsis [facility]     Stroke ([facility]     Syncope 11/30/2014    Tardive dyskinesia     Temporal lobe lesion     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 Mastectomy partial    COLONOSCOPY N/A 11/3/2016    COLONOSCOPY performed by Prema Noriega. Jose C Joseph MD at [address] [place] [address]      left temporal lobe cyst resection    HX ORTHOPAEDIC  7/2005    left ankle arthroscopic    HX TOTAL ABDOMINAL HYSTERECTOMY  1980      Social History     Social History    Marital status:      Spouse name: N/A    Number of children: N/A    Years of education: N/A     Occupational History    Not on file. Social History Main Topics    Smoking status: Never Smoker    Smokeless tobacco: Never Used    Alcohol use No    Drug use: No    Sexual activity: Not Currently     Partners: Male     Other Topics Concern    Not on file     Social History Narrative    Lives alone     Family History   Problem Relation Age of Onset    Heart Disease Mother     Hypertension Mother     Breast Cancer Mother      Dec 54yo    Cancer Sister     No Known Problems Son           Objective:   ROS:  Per HPI-  Otherwise 10 point ROS was negative    Allergies   Allergen Reactions    Haldol [Haloperidol Lactate] Other (comments)       Meds:  Outpatient Medications Prior to Visit   Medication Sig Dispense Refill    potassium chloride (KAON 20%) 40 mEq/15 mL liqd take 7.5 milliliters by mouth once daily for HYPOKALEMIA PREVENTION 480 mL 1    miconazole (MICOTIN) 2 % topical cream Apply  to affected area two (2) times a day. 15 g 0    baclofen (LIORESAL) 10 mg tablet take 1/2 tablet by mouth twice a day 90 Tab 1    ibuprofen (MOTRIN) 800 mg tablet Take 1 Tab by mouth every eight (8) hours as needed for Pain.  20 Tab 0    HYDROcodone-acetaminophen (NORCO) 5-325 mg per tablet Take 1 Tab by mouth every six (6) hours as needed for Pain. Max Daily Amount: 4 Tabs. 8 Tab 0    fexofenadine (ALLEGRA) 60 mg tablet Take 0.5 Tabs by mouth daily. 30 Tab 0    polyethylene glycol (MIRALAX) 17 gram/dose powder Take 8.5 g by mouth daily. 850 g 5    divalproex DR (DEPAKOTE) 500 mg tablet Take 1 Tab by mouth two (2) times a day. 180 Tab 1    inhalational spacing device 1 Each by Does Not Apply route as needed. 1 Device 0    albuterol (PROAIR HFA) 90 mcg/actuation inhaler Take 1 Puff by inhalation every four (4) hours as needed. Indications: BRONCHOSPASM PREVENTION 1 Inhaler 0    benztropine (COGENTIN) 1 mg tablet Take 1 mg by mouth two (2) times a day.  0    anastrozole (ARIMIDEX) 1 mg tablet take 1 tablet by mouth once daily 90 Tab 0    FLUoxetine (PROZAC) 10 mg capsule TAKE ONE CAPSULE BY MOUTH EVERY DAY 90 Cap 0    risperiDONE (RISPERDAL) 2 mg tablet Take 1 Tab by mouth two (2) times a day. 180 Tab 0    aspirin delayed-release 81 mg tablet Take 1 Tab by mouth daily. 30 Tab 3    butalbital-acetaminophen-caffeine (FIORICET, ESGIC) -40 mg per tablet Take 1 Tab by mouth every four (4) hours as needed for Headache. 60 Tab 3    gabapentin (NEURONTIN) 300 mg capsule Take 1 Cap by mouth two (2) times a day. 180 Cap 0     No facility-administered medications prior to visit. Imaging:  MRI Results (most recent):    Results from [place] encounter on 05/03/16   MRI BRAIN WO CONT   Narrative **Final Report**      ICD Codes / Adm. Diagnosis: 780.79  R53.1 / Other malaise and fatigue    Weakness  Examination:  MR BRAIN WO CON  - [id number] - May  4 2016  1:19PM  Accession No:  [id number]  Reason:  LEFT Facial droop      REPORT:  INDICATION:   LEFT Facial droop     EXAMINATION:  MRI BRAIN WO CONTRAST    COMPARISON:  CT head May 3, 2016 and MRI August 27, 2014    TECHNIQUE:  MR imaging of the brain was performed with sagittal T1, axial   T1, T2, FLAIR, GRE, DWI/ADC, coronal T2.    FINDINGS:      Patient has undergone previous left temporal craniotomy and partial left   temporal lobe resection, with enlargement of the left temporal horn similar   to prior examinations. There is no hydrocephalus or midline shift. There is   no acute intra or extra-axial fluid collection. There is no significant   white matter disease. There is no acute infarction. The major intracranial   vascular flow-voids are patent. IMPRESSION:  Postsurgical changes left temporal lobe similar to prior study. No acute   abnormality. Study performed without IV contrast material.           Signing/Reading Doctor: Rubi Walters ([id number])    Approved: Rubi Walters ([id number])  May  4 2016  2:09PM                                    CT Results (most recent):    Results from Hospital Encounter encounter on 09/19/16   CT ABD PELV W CONT   Narrative EXAM: CT ABDOMEN PELVIS WITH CONTRAST    INDICATION:  Chronic nonspecific lower abdominal pain, right greater than left,  evaluate for mass. COMPARISON: None. CONTRAST: 97 mL of Isovue-370. TECHNIQUE:   Multislice helical CT was performed from the diaphragm to the symphysis pubis  during uneventful rapid bolus intravenous contrast administration. Oral contrast  was also administered. Contiguous 5 mm axial images were reconstructed and  lung and soft tissue windows were generated. Coronal and sagittal reformations  were generated. CT dose reduction was achieved through use of a standardized  protocol tailored for this examination and automatic exposure control for dose  modulation. FINDINGS:          LOWER CHEST: There is left lower lobe airspace disease or atelectasis and there  are small bilateral pleural effusions. ABDOMEN:  Liver: The liver is normal in size and contour with no focal abnormality. The  attenuation of the liver is decreased throughout. Gallbladder and bile ducts: There is no calcified gallstone or biliary  dilatation.     Spleen: No abnormality. Pancreas: No abnormality. Adrenal glands: No abnormality. Kidneys: No abnormality. PELVIS:  Reproductive organs: The uterus is absent. Bladder: No abnormality. BOWEL AND MESENTERY: The small bowel is normal.  There is no mesenteric mass or  adenopathy. The appendix is absent. .  There is some stool throughout the colon. PERITONEUM: There is no ascites or free intraperitoneal air. RETROPERITONEUM: The aorta is atherosclerotic and tapers without aneurysm. There  is no retroperitoneal adenopathy or mass. There is no pelvic mass or adenopathy. BONES AND SOFT TISSUES: The bones and soft tissues of the abdominal wall are  within normal limits. The patient is obese. Impression IMPRESSION:   1. No mass, adenopathy or other acute abdominal or pelvic abnormality. 2. Status post hysterectomy. 3. Status post appendectomy. 4. Obesity with hepatic steatosis. 5. Mild atherosclerosis of the abdominal aorta without aneurysm. .               Reviewed records in [email or web address] and [facility] tab today    Lab Review   Results for orders placed or performed in visit on 04/19/17   LIPID PANEL   Result Value Ref Range    Cholesterol, total 166 100 - 199 mg/dL    Triglyceride 67 0 - 149 mg/dL    HDL Cholesterol 56 >39 mg/dL    VLDL, calculated 13 5 - 40 mg/dL    LDL, calculated 97 0 - 99 mg/dL   METABOLIC PANEL, COMPREHENSIVE   Result Value Ref Range    Glucose 87 65 - 99 mg/dL    BUN 23 8 - 27 mg/dL    Creatinine 0.75 0.57 - 1.00 mg/dL    BUN/Creatinine ratio 31 (H) 12 - 28    Sodium 144 134 - 144 mmol/L    Potassium 4.7 3.5 - 5.2 mmol/L    Chloride 105 96 - 106 mmol/L    CO2 22 18 - 29 mmol/L    Calcium 9.4 8.7 - 10.3 mg/dL    Protein, total 7.3 6.0 - 8.5 g/dL    Albumin 4.0 3.6 - 4.8 g/dL    GLOBULIN, TOTAL 3.3 1.5 - 4.5 g/dL    A-G Ratio 1.2 1.2 - 2.2    Bilirubin, total <0.2 0.0 - 1.2 mg/dL    Alk.  phosphatase 72 39 - 117 IU/L    AST (SGOT) 16 0 - 40 IU/L    ALT (SGPT) 13 0 - 32 IU/L Exam:  Visit Vitals    Resp 18    Ht 5' 5\" (1.651 m)    Wt 116.6 kg (257 lb)    BMI 42.77 kg/m2     General:  Alert, cooperative, no distress. Head:  Normocephalic, without obvious abnormality, atraumatic. Respiratory:  Heart:   Non labored breathing  Regular rate and rhythm, no murmurs   Extrem: 2+ edema bilateral LE       Pulses: 2+ radial pulses. Neurologic:  MS: Alert and oriented x 4, speech intact. Language intact. Attention and fund of knowledge appropriate. Recent and remote memory intact. Cranial Nerves:  II: visual fields Full to confrontation   II: pupils    II: optic disc    III,VII: ptosis none   III,IV,VI: extraocular muscles  EOMI, no nystagmus or diplopia   V: facial light touch sensation     VII: facial muscle function   symmetric   VIII: hearing intact   IX: soft palate elevation     XI: trapezius strength     XI: sternocleidomastoid strength    XII: tongue       Motor: normal bulk and tone, right resting tremor, mild orobuccal dyskinesias  Strength: 5/5 throughout, no PD  Sensory: Markedly dec to vibration at ankles  Coordination: FTN and RAYMOND intact, HTS limited due to body habitus, but intact  Gait: stooped posture, normal gait with rolling walker  Reflexes: 2+ symmetric in UE, unable to elicit in LE, possibly due to body habitus, edema           Assessment/Plan   Pt is a 77 y.o. 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 Acute on chronic c/o bilateral LE numbness in feet, now progressing up legs to buttocks at times, and increasing difficulties with imbalance and ambulation. Exam with stooped posture, mild orobuccal dyskinesias, right resting tremor, markedly dec vibration to ankle, dec reflexes in LE, steady gait with walker with normal stride.    Worsening gait likely multifactorial with deconditioning, wt gait contributing, but does have severe central and neuroforaminal stenosis seen on CT of the L-spine in July 2015 and possibly PN. Recommend MRI of the L-spine without contrast to reassess lumbar spine disease. Patient is encouraged to continue her physical therapy and exercise at the gym. Seizures are well controlled continue Depakote 500 mg twice daily. Follow-up in clinic in 2 months to review MRI results and next steps. ICD-10-CM ICD-9-CM    1. Seizure disorder ([facility] G40.909 345.90        Signed:   Lorenza Fish MD  5/8/2017

## 2017-05-14 NOTE — TELEPHONE ENCOUNTER
On Call Provider    Patient request alternate medication for chronic rash on hand; not better with current medications. Patient advised unable to effectively treat rash without evaluation, encouraged to schedule appointment with PCP.     She expressed understanding and willingness to follow recommendations

## 2017-05-15 NOTE — TELEPHONE ENCOUNTER
Scheduled Appt for Ms Bailon to See Dr Marietta Loaiza on Thursday may 18  To address her on going rash on her hands

## 2017-05-15 NOTE — TELEPHONE ENCOUNTER
----- Message from Pee Lindsey sent at 5/13/2017 12:00 PM EDT -----  Regarding: Dr. Ciro Wright states the fungus on her hands is not clearing up. Pt states the medication prescribed is not working and is asking for another cream for her hands. Pt is requesting a call back to discuss options. [facility], [phone].

## 2017-05-15 NOTE — TELEPHONE ENCOUNTER
During her visit 1 month ago, there was some concern that she may be developing scabies vs fungal infection. If she has been using topical antifungal for more than 1 week 2 times a day without improvement, we should re-evaluate. Please advise to make appointment.      Thanks  Malcom Laguerre MD

## 2017-05-16 NOTE — MR AVS SNAPSHOT
Visit Information Date & Time Provider Department Dept. Phone Encounter #  
 5/16/2017  4:15 PM Wayne Gonzalez MD [address] [facility] [phone] [id number] Follow-up Instructions Return if symptoms worsen or fail to improve. Your Appointments 7/7/2017  9:40 AM  
Follow Up with Aramis Fu MD  
[facility] at [address] [facility]) Appt Note: 2 month f/u seizures NN 5/8/17 [address] [place] [address] Drive [address] [facility] [address]  
[phone]  
  
   
 [address] [place] [id number]  
  
    
 7/20/2017  2:00 PM  
Any with Vic Beavers MD  
[address] [place] ([facility]) Appt Note: yrly cpap follow up [address] [address]  
[phone]  
  
   
 [address] [address]  
  
    
 7/26/2017  8:15 AM  
ROUTINE CARE with Wayne Gonzalez MD  
[facility]) Appt Note: 3 month follow-up [address] [facility] [address] [facility] [address]  
[facility] [address] [facility] [address] Upcoming Health Maintenance Date Due DTaP/Tdap/Td series (1 - Tdap) 8/15/1971 GLAUCOMA SCREENING Q2Y 8/15/2015 FOBT Q 1 YEAR AGE 50-75 3/25/2016 MEDICARE YEARLY EXAM 7/22/2017 INFLUENZA AGE 9 TO ADULT 8/1/2017 BREAST CANCER SCRN MAMMOGRAM 8/12/2018 Pneumococcal 65+ High/Highest Risk (2 of 2 - PPSV23) 6/1/2019 Allergies as of 5/16/2017  Review Complete On: 5/16/2017 By: Rubi Soria Severity Noted Reaction Type Reactions Haldol [Haloperidol Lactate]  08/26/2014    Other (comments) Current Immunizations  Reviewed on 3/11/2015 Name Date Influenza High Dose Vaccine PF 8/22/2016 Influenza Vaccine 10/15/2014 Influenza Vaccine Split 10/13/2010 Pneumococcal Conjugate (PCV-13) 7/21/2016 Pneumococcal Vaccine (Unspecified Type) 6/1/2014 Zoster Vaccine, Live 8/22/2016 Not reviewed this visit You Were Diagnosed With   
  
 Codes Comments Scabies infestation    -  Primary ICD-10-CM: B86 
ICD-9-CM: 133.0 Vitals BP Pulse Temp Resp Height(growth percentile) Weight(growth percentile) 139/74 87 98.1 °F (36.7 °C) (Oral) 20 5' 5\" (1.651 m) 262 lb 12.8 oz (119.2 kg) SpO2 BMI OB Status Smoking Status 95% 43.73 kg/m2 Hysterectomy Never Smoker Vitals History BMI and BSA Data Body Mass Index Body Surface Area 43.73 kg/m 2 2.34 m 2 Preferred Pharmacy Pharmacy Name Phone [facility] [address] - [place], [address] [place] [address] [phone] Your Updated Medication List  
  
   
This list is accurate as of: 5/16/17  5:03 PM.  Always use your most recent med list.  
  
  
  
  
 albuterol 90 mcg/actuation inhaler Commonly known as:  PROAIR HFA Take 1 Puff by inhalation every four (4) hours as needed. Indications: BRONCHOSPASM PREVENTION  
  
 anastrozole 1 mg tablet Commonly known as:  ARIMIDEX  
take 1 tablet by mouth once daily  
  
 aspirin delayed-release 81 mg tablet Take 1 Tab by mouth daily. baclofen 10 mg tablet Commonly known as:  LIORESAL  
take 1/2 tablet by mouth twice a day  
  
 benztropine 1 mg tablet Commonly known as:  COGENTIN Take 1 mg by mouth two (2) times a day. divalproex  mg tablet Commonly known as:  DEPAKOTE Take 1 Tab by mouth two (2) times a day. FLUoxetine 10 mg capsule Commonly known as:  PROzac TAKE ONE CAPSULE BY MOUTH EVERY DAY  
  
 furosemide 20 mg tablet Commonly known as:  LASIX Take 20 mg by mouth every other day.  
  
 gabapentin 300 mg capsule Commonly known as:  NEURONTIN Take 1 Cap by mouth two (2) times a day. HYDROcodone-acetaminophen 5-325 mg per tablet Commonly known as:  Nayla Mcclure  
 Take 1 Tab by mouth every six (6) hours as needed for Pain. Max Daily Amount: 4 Tabs. ibuprofen 800 mg tablet Commonly known as:  MOTRIN Take 1 Tab by mouth every eight (8) hours as needed for Pain.  
  
 inhalational spacing device 1 Each by Does Not Apply route as needed. ivermectin 3 mg tablet Commonly known as:  STROMECTOL Take 4 Tabs by mouth once for 1 dose. Once then repeat in 1 weeks. loratadine 10 mg tablet Commonly known as:  CLARITIN  
  
 miconazole 2 % topical ointment Commonly known as:  Marshal Setter Apply  to affected area two (2) times a day. naproxen 500 mg tablet Commonly known as:  NAPROSYN  
  
 polyethylene glycol 17 gram/dose powder Commonly known as:  Beuford Hallmark Take 8.5 g by mouth daily. potassium chloride 40 mEq/15 mL Liqd Commonly known as:  KAON 20%  
take 7.5 milliliters by mouth once daily for HYPOKALEMIA PREVENTION  
  
 risperiDONE 2 mg tablet Commonly known as:  RisperDAL Take 1 Tab by mouth two (2) times a day. triamcinolone acetonide 0.1 % ointment Commonly known as:  KENALOG Apply  to affected area two (2) times a day. use thin layer as needed for itching. Prescriptions Sent to Pharmacy Refills  
 triamcinolone acetonide (KENALOG) 0.1 % ointment 0 Sig: Apply  to affected area two (2) times a day. use thin layer as needed for itching. Class: Normal  
 Pharmacy: [address], [address] Ph #: [phone] Route: Topical  
 ivermectin (STROMECTOL) 3 mg tablet 1 Sig: Take 4 Tabs by mouth once for 1 dose. Once then repeat in 1 weeks. Class: Normal  
 Pharmacy: [address], [address] [address] [facility] [address] Ph #: [phone] Route: Oral  
  
Follow-up Instructions Return if symptoms worsen or fail to improve. To-Do List   
 05/17/2017 1:45 PM  
  Appointment with [address] [place] [address] MRI 2 at [facility] MRI Department ([phone]) 1. Please bring a list or a bag of your current medications to your appointment 2. Please be sure to remove ALL hair clips, pins, extensions, etc., prior to arriving for your MRI procedure. 3. Bring any non [facility] films or CDs pertaining to the area being imaged with you on the day of appointment. 4. A written order with a valid diagnosis and Physicians  signature is required for all scheduled tests. 5. Check in at registration 30min before your appointment time unless you were instructed to do otherwise. Patient Instructions Scabies: Care Instructions Your Care Instructions Scabies is a skin problem that can cause intense itching. It is caused by very tiny bugs called mites that dig just under the skin and lay eggs. An allergic reaction to the mites causes the itching. Scabies is usually spread by person-to-person contact. It is also possible, but not common, for scabies to spread through towels, clothes, and bedding. Everyone in your household should be treated. Scabies is treated with medicine. Itching may last for several weeks after treatment. Follow-up care is a key part of your treatment and safety. Be sure to make and go to all appointments, and call your doctor if you are having problems. It's also a good idea to know your test results and keep a list of the medicines you take. How can you care for yourself at home? · Use the lotion or cream your doctor recommends or prescribes. One treatment usually cures scabies. Do not use the cream again unless your doctor tells you to. · Wash all clothes, bedding, and towels that you used in the 3 days before you started treatment. Use hot water, and use the hot cycle in the dryer. Another option is to dry-clean these items. Or seal them in a plastic bag for 3 to 7 days. · Take an oral antihistamine, such as loratadine (Claritin) or diphenhydramine (Benadryl), to help stop itching.  You also can use a nonprescription anti-itch cream. Read and follow all instructions on the label. · Do not have physical contact with other people or let anyone use your personal items until you have finished treatment. Do not use other people's personal items until your treatment is done. Tell people with whom you have close contact that they will need treatment if they have symptoms. · Take an oatmeal bath to help relieve itching. Add a handful of oatmeal (ground to a powder) to your bath. Or you can try an oatmeal bath product, such as Aveeno. When should you call for help? Call your doctor now or seek immediate medical care if: 
· You have signs of infection, such as: 
¨ Increased pain, swelling, warmth, or redness. ¨ Red streaks leading from the mite bites. ¨ Pus draining from a bite area. ¨ A fever. Watch closely for changes in your health, and be sure to contact your doctor if: · Anyone else in your family has itching. · You do not get better within 2 weeks. Where can you learn more? Go to http://rose-jaiden.info/. Enter O907 in the search box to learn more about \"Scabies: Care Instructions. \" Current as of: October 13, 2016 Content Version: 11.2 © 3794-3491 Padloc. Care instructions adapted under license by hubbuzz.com (which disclaims liability or warranty for this information). If you have questions about a medical condition or this instruction, always ask your healthcare professional. [place] [address] any warranty or liability for your use of this information. Introducing [facility] & HEALTH SERVICES! Dear Aramis Pollard: Thank you for requesting a [employer] account. Our records indicate that you already have an active [employer] account. You can access your account anytime at [email or web address] Did you know that you can access your hospital and ER discharge instructions at any time in [employer]?   You can also review all of your test results from your hospital stay or ER visit. Additional Information If you have questions, please visit the Frequently Asked Questions section of the [employer] website at [email or web address] Remember, [employer] is NOT to be used for urgent needs. For medical emergencies, dial 911. Now available from your iPhone and Android! Please provide this summary of care documentation to your next provider. Your primary care clinician is listed as Lelia Merida. If you have any questions after today's visit, please call [phone].

## 2017-05-16 NOTE — PATIENT INSTRUCTIONS
Scabies: Care Instructions  Your Care Instructions  Scabies is a skin problem that can cause intense itching. It is caused by very tiny bugs called mites that dig just under the skin and lay eggs. An allergic reaction to the mites causes the itching. Scabies is usually spread by person-to-person contact. It is also possible, but not common, for scabies to spread through towels, clothes, and bedding. Everyone in your household should be treated. Scabies is treated with medicine. Itching may last for several weeks after treatment. Follow-up care is a key part of your treatment and safety. Be sure to make and go to all appointments, and call your doctor if you are having problems. It's also a good idea to know your test results and keep a list of the medicines you take. How can you care for yourself at home? · Use the lotion or cream your doctor recommends or prescribes. One treatment usually cures scabies. Do not use the cream again unless your doctor tells you to. · Wash all clothes, bedding, and towels that you used in the 3 days before you started treatment. Use hot water, and use the hot cycle in the dryer. Another option is to dry-clean these items. Or seal them in a plastic bag for 3 to 7 days. · Take an oral antihistamine, such as loratadine (Claritin) or diphenhydramine (Benadryl), to help stop itching. You also can use a nonprescription anti-itch cream. Read and follow all instructions on the label. · Do not have physical contact with other people or let anyone use your personal items until you have finished treatment. Do not use other people's personal items until your treatment is done. Tell people with whom you have close contact that they will need treatment if they have symptoms. · Take an oatmeal bath to help relieve itching. Add a handful of oatmeal (ground to a powder) to your bath. Or you can try an oatmeal bath product, such as Aveeno. When should you call for help?   Call your doctor now or seek immediate medical care if:  · You have signs of infection, such as:  ¨ Increased pain, swelling, warmth, or redness. ¨ Red streaks leading from the mite bites. ¨ Pus draining from a bite area. ¨ A fever. Watch closely for changes in your health, and be sure to contact your doctor if:  · Anyone else in your family has itching. · You do not get better within 2 weeks. Where can you learn more? Go to http://rose-jaiden.info/. Enter E135 in the search box to learn more about \"Scabies: Care Instructions. \"  Current as of: October 13, 2016  Content Version: 11.2  © 2834-6292 OmniStrat. Care instructions adapted under license by WorkWith.me (which disclaims liability or warranty for this information). If you have questions about a medical condition or this instruction, always ask your healthcare professional. [address] any warranty or liability for your use of this information.

## 2017-05-16 NOTE — PROGRESS NOTES
OLIVIA Rayo is a 77 y.o. female, she presents today for:    Ongoing rash on hands. Was sen ~ 2 weeks ago and given miconazole to treat, changed from lotion to ointment ~ 5 days ago. Despite  Using 2 times a day. Notes painful cracking between webs of fingers. Wearing tissues on hands. Does not have rash in any other areas. PMH/PSH: reviewed and updated  Sochx:  reports that she has never smoked. She has never used smokeless tobacco. She reports that she does not drink alcohol or use illicit drugs. Famhx: reviewed and updated     All: Allergies   Allergen Reactions    Haldol [Haloperidol Lactate] Other (comments)     Med:   Current Outpatient Prescriptions   Medication Sig    furosemide (LASIX) 20 mg tablet Take 20 mg by mouth every other day.  loratadine (CLARITIN) 10 mg tablet     naproxen (NAPROSYN) 500 mg tablet     miconazole (MICONTIN) 2 % topical ointment Apply  to affected area two (2) times a day.  divalproex DR (DEPAKOTE) 500 mg tablet Take 1 Tab by mouth two (2) times a day.  potassium chloride (KAON 20%) 40 mEq/15 mL liqd take 7.5 milliliters by mouth once daily for HYPOKALEMIA PREVENTION    baclofen (LIORESAL) 10 mg tablet take 1/2 tablet by mouth twice a day    ibuprofen (MOTRIN) 800 mg tablet Take 1 Tab by mouth every eight (8) hours as needed for Pain.  HYDROcodone-acetaminophen (NORCO) 5-325 mg per tablet Take 1 Tab by mouth every six (6) hours as needed for Pain. Max Daily Amount: 4 Tabs.  polyethylene glycol (MIRALAX) 17 gram/dose powder Take 8.5 g by mouth daily.  inhalational spacing device 1 Each by Does Not Apply route as needed.  albuterol (PROAIR HFA) 90 mcg/actuation inhaler Take 1 Puff by inhalation every four (4) hours as needed. Indications: BRONCHOSPASM PREVENTION    benztropine (COGENTIN) 1 mg tablet Take 1 mg by mouth two (2) times a day.     anastrozole (ARIMIDEX) 1 mg tablet take 1 tablet by mouth once daily    FLUoxetine (PROZAC) 10 mg capsule TAKE ONE CAPSULE BY MOUTH EVERY DAY    risperiDONE (RISPERDAL) 2 mg tablet Take 1 Tab by mouth two (2) times a day.  aspirin delayed-release 81 mg tablet Take 1 Tab by mouth daily.  gabapentin (NEURONTIN) 300 mg capsule Take 1 Cap by mouth two (2) times a day. No current facility-administered medications for this visit. Review of Systems   Constitutional: Negative for chills, fever and weight loss. HENT: Negative for congestion. Respiratory: Negative for cough, shortness of breath and wheezing. Cardiovascular: Negative for chest pain and leg swelling. Gastrointestinal: Negative for abdominal pain, diarrhea, nausea and vomiting. Genitourinary: Negative for dysuria. Skin: Negative for rash. Neurological: Negative for dizziness and headaches. PE:  Blood pressure 139/74, pulse 87, temperature 98.1 °F (36.7 °C), temperature source Oral, resp. rate 20, height 5' 5\" (1.651 m), weight 262 lb 12.8 oz (119.2 kg), SpO2 95 %. Body mass index is 43.73 kg/(m^2). Physical Exam   Constitutional: She is oriented to person, place, and time. No distress. HENT:   Head: Normocephalic. Mouth/Throat: Oropharynx is clear and moist.   Eyes: Conjunctivae are normal. Pupils are equal, round, and reactive to light. Neck: Neck supple. Cardiovascular: Normal rate. Pulmonary/Chest: Effort normal.   Neurological: She is alert and oriented to person, place, and time. Skin: Rash (papular plaque like rash in webbing of fingers with linear tracts/breaks in skin. no rash at wrist) noted. Nursing note and vitals reviewed. A/P:  77 y.o. female    ICD-10-CM ICD-9-CM    1. Scabies infestation B86 133.0 triamcinolone acetonide (KENALOG) 0.1 % ointment      ivermectin (STROMECTOL) 3 mg tablet      DISCONTINUED: ivermectin (STROMECTOL) 3 mg tablet     - rash failed treatment with topical antifungal. Though limited distribution, most consistent otherwise for possible scabies infection. Patient with limited ability to reach entire body for bathing. Would benefit most from oral treatment with ivermectin. Will also supply topical steroid to help reduce inflammation. Follow-up Disposition:  Return if symptoms worsen or fail to improve.   Future Appointments  Date Time Provider Lisandro Cathleen   5/17/2017 1:45 PM [facility] MRI [address]   7/7/2017 9:40 AM Lorenza Fish MD [address]   7/20/2017 2:00 PM Gerald Goldberg, MD [address]   7/26/2017 8:15 AM Lawrence Justice MD [address] [facility] No

## 2017-05-17 NOTE — PROGRESS NOTES
Patient called regarding clarification on directions on ivemectin 3 mg pill. Instructed patient to take four tablets(12mg total) by mouth for one dose. Then another dose of four tablets a week later. Patient understood instructions.

## 2017-05-18 NOTE — TELEPHONE ENCOUNTER
----- Message from Karime Reynolds sent at 5/18/2017  2:04 PM EDT -----  Regarding: FW: Dr. Cali Alfonso       ----- Message -----     From: Diane Kramer     Sent: 5/18/2017   1:14 PM       To: [place] Office  Subject: Dr. Cali Alfonso                             Pt. is requesting a call back regarding the fact MRI could not be performed due to bedbugs in house. Best contact number for pt.  is [phone].

## 2017-05-18 NOTE — TELEPHONE ENCOUNTER
Spoke with patient. She stated she is unable to get her MRI done right now due to exposure to bedbugs. She was seen at her primary care on 5/16/17 for this matter. Writer told patient that we were not aware how long she needed to wait to get her MRI. Writer advised for patient to contact her PCP to determine recovery time and when she is no longer considered contagious and can be in contact with machinery that is shared with others. Patient stated she would call her PCP.

## 2017-05-18 NOTE — TELEPHONE ENCOUNTER
----- Message from Annie Gilliam sent at 5/18/2017  4:07 PM EDT -----  Regarding: FW: /telephone      ----- Message -----     From: July Orozco     Sent: 5/18/2017   2:34 PM       To: [facility] Front Office  Subject: /telephone                              Pt is trying to schedule an appointment for an MRI. Best contact number is [phone].

## 2017-05-19 NOTE — TELEPHONE ENCOUNTER
----- Message from Isela Simons sent at 5/19/2017  9:43 AM EDT -----  Regarding: FW: Dr Cristina Gallegos  Contact: [phone]      ----- Message -----     From: Grace Morales     Sent: 5/19/2017   9:03 AM       To: [facility] Front Office  Subject: Dr Maier/Telephone                              Pt needs the Nurse to give her a call

## 2017-05-19 NOTE — TELEPHONE ENCOUNTER
Request for Ivermectin 3 MG tab     Last seen at Tuesday, May 16, 2017  Upcoming Wednesday, July 26, 2017

## 2017-05-31 NOTE — TELEPHONE ENCOUNTER
Spoke with patient. She stated she needed to get her MRI scheduled. Patient was given the contact number for the patient care team to schedule her MRI. Patient was given an opportunity to ask questions, repeated information, and verbalized understanding.

## 2017-06-09 NOTE — TELEPHONE ENCOUNTER
Called pt and discussed MRI L-Spine results with pt. Severe spinal stenosis. Will refer to [facility]. Pt asked me to call her sister Donna Copping with results - CARLOS Cain - Please call pt regarding NSG referral.  This needs to be ASAP.

## 2017-06-12 NOTE — TELEPHONE ENCOUNTER
Spoke with patient and informed her that Dr. Buffy Cortez has referred her to neurosurgery, Dr. Becky Joseph. Advised that they will be contacting patient to set up an appointment. Patient was given an opportunity to ask questions, repeated information, and verbalized understanding.

## 2017-06-12 NOTE — TELEPHONE ENCOUNTER
Return call to patient. She stated she was confused. Someone left her a message about an appointment on the 14th but she did not know who called her. Writer suggested it may have been Dr. Jaylene sauceda who called her (see previous phone note). Writer provided her with the contact number to call Dr. Jaylene sauceda to double check if they called her. Patient was given an opportunity to ask questions, repeated information, and verbalized understanding.

## 2017-06-21 NOTE — TELEPHONE ENCOUNTER
At this point if the rash has returned to hands, I would suggest she follow-up with dermatologist to confirm diagnosis.      Please provide information for [facility] dermatology [phone]  Daljit Jessica MD

## 2017-06-21 NOTE — TELEPHONE ENCOUNTER
Called and spoke with pt, she stated she \"feels the pain in her hands again\" Rx was given 5/17 for rashand possible scabies     Please advise  , thank you

## 2017-06-22 NOTE — TELEPHONE ENCOUNTER
Spoke with patient after verifying name and  regarding Dr. Joellen Alvarenga recommendations. Writer informed patient of Dr. Joellen Alvarenga recommendations. Patient given an opportunity to ask questions, repeated information, and verbalized understanding.

## 2017-07-12 NOTE — TELEPHONE ENCOUNTER
Spoke with patient. She stated she has had her appointment with Dr. Amador Ko changed several times. She stated she is scheduled for 7/21/17 with Dr. Amador Ko and has a follow up with Dr. Rubio Rodgers on 7/25/17. Writer advised for patient to keep both appointments. Patient was given an opportunity to ask questions, repeated information, and verbalized understanding.

## 2017-07-12 NOTE — TELEPHONE ENCOUNTER
Called Dr. Ronda Cartwright office and informed the , Aakash Martinez, that Ms. Bailon was referred to Dr. Ronda Cartwright office on 6/12/17 with a referral request marked \"ASAP\" and that the patient has been rescheduled on several times and now her appointment is not until 7/21/17. Writer asked if there was a sooner appointment available with any provider. After being on hold for several minutes, Aakash Martinez stated they would call the patient to schedule a sooner appointment.

## 2017-07-13 NOTE — TELEPHONE ENCOUNTER
Return call to patient. She stated that Dr. Pinky Shone office did call her but she was unable to change her appointment to a sooner date since she has something driving her to the appointment and they have already made arrangements. Writer advised to keep appointment with Dr. Cherie Zapata on 7/21/17 and to keep the f/u appointment with Dr. Willi Rocha on 7/25/17. Patient was given an opportunity to ask questions, repeated information, and verbalized understanding.

## 2017-07-13 NOTE — TELEPHONE ENCOUNTER
Left message for patient to call the office back to see if she was able to get a sooner appointment with Dr. Rowena Morris.

## 2017-07-20 NOTE — PROGRESS NOTES
Patient is currently using a Resmed Airfit F20 Full Face-small instead of the Fitjabraut 87 full face- Small. She has received the proper air filters for her device but the wrong tubing. She needs a climate line tube for the [employer] device. So she will need an order placed for the tubing.

## 2017-07-20 NOTE — PROGRESS NOTES
217 [facility] [place]. [place], [address]  Tel.  [phone]  Fax. 100 [facility] 60  [place], [address] [facility]  Tel.  [phone]  Fax. [phone] [address] Jimmy Mirza [address]  Tel.  [phone]  Fax. [phone]     S>Daxa Bailon is a 77 y.o. female seen for a positive airway pressure follow-up. She reports minor problems using the device. She is 47% compliant over the past 30 days. The following problems are identified:    Drowsiness no Problems exhaling no   Snoring no Forget to put on no   Mask Comfortable yes Can't fall asleep no   Dry Mouth no Mask falls off no   Air Leaking yes Frequent awakenings no       She admits that her sleep has improved. She states that she is using her device on a nightly basis, she does wake up to urinate but on return to bed resumes therapy. Review of download is indicative of a residual AHI of 8.5 - device use has occurred on 23 of the last 30 nights and use > 4 hours on 14 nights. She is unable to explain this lack of use. Allergies   Allergen Reactions    Haldol [Haloperidol Lactate] Other (comments)       She has a current medication list which includes the following prescription(s): miconazole, ivermectin, loratadine, triamcinolone acetonide, furosemide, naproxen, miconazole, divalproex dr, potassium chloride, baclofen, ibuprofen, polyethylene glycol, inhalational spacing device, albuterol, benztropine, anastrozole, fluoxetine, risperidone, aspirin delayed-release, gabapentin, and hydrocodone-acetaminophen. .      She  has a past medical history of Anemia (12/1/2014); ARF (acute renal failure) ([facility] (12/5/2014); Edema; Encephalopathy (12/5/2014); Epilep NEC w/o intr epil; Fall (2/2/2015); Gait disturbance; Hyperlipidemia; Hypertension; Infiltrating ductal carcinoma of breast ([facility] Peripheral neuropathy ([facility] Psychiatric disorder; Rhabdomyolysis; Schizoaffective disorder; Sepsis ([facility]  Stroke [facility]); Syncope (11/30/2014); Tardive dyskinesia; and Temporal lobe lesion. Cypress Sleepiness Score: 6   and Modified F.O.S.Q. Score Total / 2: 19   which reflect improved sleep quality over therapy time. O>    Visit Vitals    /79 (BP 1 Location: Left arm, BP Patient Position: Sitting)    Pulse 96    Ht 5' 5\" (1.651 m)    Wt 259 lb 1.6 oz (117.5 kg)    SpO2 95%    BMI 43.12 kg/m2         General:   Not in acute distress   Eyes:  Anicteric sclerae, no obvious strabismus   Nose:  No obvious nasal septum deviation    Oropharynx:   Class 4 oropharyngeal outlet, thick tongue base, uvula not seen due to low-lying soft palate, narrow tonsilo-pharyngeal pilars   Tonsils:   tonsils are not visualized due to low-lying soft palate   Neck:   midline trachea   Chest/Lungs:  Equal lung expansion, clear on auscultation    CVS:  Normal rate, regular rhythm; no JVD   Skin:  Warm to touch; no obvious rashes   Neuro:  No focal deficits ; no obvious tremor    Psych:  Normal affect,  normal countenance;           A>    ICD-10-CM ICD-9-CM    1. JULIAN treated with BiPAP G47.33 327.23 SLEEP LAB (PAP TITRATION)   2. Chronic obstructive pulmonary disease, unspecified COPD type ([facility] J44.9 496 SLEEP LAB (PAP TITRATION)   3. Schizoaffective disorder, unspecified type ([facility] F25.9 295.70    4. Essential hypertension I10 401.9      AHI = 32.9. On Bi - Level :  14/08 cmH2O. Compliant:      no    Therapeutic Response:  Negative    P>  * Mask evaluation done in the office - see tech notes. * PAP  Titration due to residual apnea and poor compliance on therapy which cannot be explained by patient history. * We have recommended a dedicated weight loss through appropriate diet and an exercise regiment as significant weight reduction has been shown to reduce severity of obstructive sleep apnea. * Follow-up Disposition:  Return in about 1 year (around 7/20/2018), or if symptoms worsen or fail to improve.     * She was asked to contact our office for any problems regarding PAP therapy. * Counseling was provided regarding the importance of regular PAP use and on proper sleep hygiene and safe driving. * Re-enforced proper and regular cleaning for the device. Thank you for allowing us to participate in your patient's medical care. Tian Christie MD, FAASM  Electronically signed.  07/20/17

## 2017-07-20 NOTE — PATIENT INSTRUCTIONS
217 [facility] [place]. [place], [address]  Tel.  [phone]  Fax. 100 [facility] 60  [place], [address] [facility]  Tel.  [phone]  Fax. [phone] [facility] [place] [address] [address]  Tel.  [phone]  Fax. [phone]     Learning About CPAP for Sleep Apnea  What is CPAP? CPAP is a small machine that you use at home every night while you sleep. It increases air pressure in your throat to keep your airway open. When you have sleep apnea, this can help you sleep better so you feel much better. CPAP stands for \"continuous positive airway pressure. \"  The CPAP machine will have one of the following:  · A mask that covers your nose and mouth  · Prongs that fit into your nose  · A mask that covers your nose only, the most common type. This type is called NCPAP. The N stands for \"nasal.\"  Why is it done? CPAP is usually the best treatment for obstructive sleep apnea. It is the first treatment choice and the most widely used. Your doctor may suggest CPAP if you have:  · Moderate to severe sleep apnea. · Sleep apnea and coronary artery disease (CAD) or heart failure. How does it help? · CPAP can help you have more normal sleep, so you feel less sleepy and more alert during the daytime. · CPAP may help keep heart failure or other heart problems from getting worse. · NCPAP may help lower your blood pressure. · If you use CPAP, your bed partner may also sleep better because you are not snoring or restless. What are the side effects? Some people who use CPAP have:  · A dry or stuffy nose and a sore throat. · Irritated skin on the face. · Sore eyes. · Bloating. If you have any of these problems, work with your doctor to fix them. Here are some things you can try:  · Be sure the mask or nasal prongs fit well. · See if your doctor can adjust the pressure of your CPAP. · If your nose is dry, try a humidifier.   · If your nose is runny or stuffy, try decongestant medicine or a steroid nasal spray. If these things do not help, you might try a different type of machine. Some machines have air pressure that adjusts on its own. Others have air pressures that are different when you breathe in than when you breathe out. This may reduce discomfort caused by too much pressure in your nose. Where can you learn more? Go to [email or web address].be  Enter Miranda Berry in the search box to learn more about \"Learning About CPAP for Sleep Apnea. \"   © 9526-8101 Healthwise, [employer]. Care instructions adapted under license by Shakir Adam (which disclaims liability or warranty for this information). This care instruction is for use with your licensed healthcare professional. If you have questions about a medical condition or this instruction, always ask your healthcare professional. [address] any warranty or liability for your use of this information. Content Version: 0.9.33598; Last Revised: January 11, 2010  PROPER SLEEP HYGIENE    What to avoid  · Do not have drinks with caffeine, such as coffee or black tea, for 8 hours before bed. · Do not smoke or use other types of tobacco near bedtime. Nicotine is a stimulant and can keep you awake. · Avoid drinking alcohol late in the evening, because it can cause you to wake in the middle of the night. · Do not eat a big meal close to bedtime. If you are hungry, eat a light snack. · Do not drink a lot of water close to bedtime, because the need to urinate may wake you up during the night. · Do not read or watch TV in bed. Use the bed only for sleeping and sexual activity. What to try  · Go to bed at the same time every night, and wake up at the same time every morning. Do not take naps during the day. · Keep your bedroom quiet, dark, and cool. · Get regular exercise, but not within 3 to 4 hours of your bedtime. .  · Sleep on a comfortable pillow and mattress.   · If watching the clock makes you anxious, turn it facing away from you so you cannot see the time. · If you worry when you lie down, start a worry book. Well before bedtime, write down your worries, and then set the book and your concerns aside. · Try meditation or other relaxation techniques before you go to bed. · If you cannot fall asleep, get up and go to another room until you feel sleepy. Do something relaxing. Repeat your bedtime routine before you go to bed again. · Make your house quiet and calm about an hour before bedtime. Turn down the lights, turn off the TV, log off the computer, and turn down the volume on music. This can help you relax after a busy day. Drowsy Driving: The [employer] cites drowsiness as a causing factor in more than 627,476 police reported crashes annually, resulting in 76,000 injuries and 1,500 deaths. Other surveys suggest 55% of people polled have driven while drowsy in the past year, 23% had fallen asleep but not crashed, 3% crashed, and 2% had and accident due to drowsy driving. Who is at risk? Young Drivers: One study of drowsy driving accidents states that 55% of the drivers were under 25 years. Of those, 75% were male. Shift Workers and Travelers: People who work overnight or travel across time zones frequently are at higher risk of experiencing Circadian Rhythm Disorders. They are trying to work and function when their body is programed to sleep. Sleep Deprived: Lack of sleep has a serious impact on your ability to pay attention or focus on a task. Consistently getting less than the average of 8 hours your body needs creates partial or cumulative sleep deprivation. Untreated Sleep Disorders: Sleep Apnea, Narcolepsy, R.L.S., and other sleep disorders (untreated) prevent a person from getting enough restful sleep. This leads to excessive daytime sleepiness and increases the risk for drowsy driving accidents by up to 7 times.   Medications / Alcohol: Even over the counter medications can cause drowsiness. Medications that impair a drivers attention should have a warning label. Alcohol naturally makes you sleepy and on its own can cause accidents. Combined with excessive drowsiness its effects are amplified. Signs of Drowsy Driving:   * You don't remember driving the last few miles   * You may drift out of your yair   * You are unable to focus and your thoughts wander   * You may yawn more often than normal   * You have difficulty keeping your eyes open / nodding off   * Missing traffic signs, speeding, or tailgating  Prevention-   Good sleep hygiene, lifestyle and behavioral choices have the most impact on drowsy driving. There is no substitute for sleep and the average person requires 8 hours nightly. If you find yourself driving drowsy, stop and sleep. Consider the sleep hygiene tips provided during your visit as well. Medication Refill Policy: Refills for all medications require 1 week advance notice. Please have your pharmacy fax a refill request. We are unable to fax, or call in \"controled substance\" medications and you will need to pick these prescriptions up from our office. [employer] Activation    Thank you for requesting access to [employer]. Please follow the instructions below to securely access and download your online medical record. [employer] allows you to send messages to your doctor, view your test results, renew your prescriptions, schedule appointments, and more. How Do I Sign Up? 1. In your internet browser, go to [email or web address]. 2. Click on the First Time User? Click Here link in the Sign In box. You will see the New Member Sign Up page. 3. Enter your [employer] Access Code exactly as it appears below. You will not need to use this code after youve completed the sign-up process. If you do not sign up before the expiration date, you must request a new code. [employer]EM Access Code:  Activation code not generated  Current [employer] Status: Active (This is the date your [employer] access code will )    4. Enter the last four digits of your Social Security Number (xxxx) and Date of Birth (mm/dd/yyyy) as indicated and click Submit. You will be taken to the next sign-up page. 5. Create a echoechot ID. This will be your echoechot login ID and cannot be changed, so think of one that is secure and easy to remember. 6. Create a [employer] password. You can change your password at any time. 7. Enter your Password Reset Question and Answer. This can be used at a later time if you forget your password. 8. Enter your e-mail address. You will receive e-mail notification when new information is available in [address] 9. Click Sign Up. You can now view and download portions of your medical record. 10. Click the Download Summary menu link to download a portable copy of your medical information. Additional Information    If you have questions, please call [phone]. Remember, [employer] is NOT to be used for urgent needs. For medical emergencies, dial 911.

## 2017-07-25 NOTE — MR AVS SNAPSHOT
Visit Information Date & Time Provider Department Dept. Phone Encounter #  
 7/25/2017  2:20 PM Marisela Rollins MD [facility] at [place] [address] [phone] Follow-up Instructions Return in about 4 months (around 11/25/2017). Your Appointments 7/26/2017  8:15 AM  
ROUTINE CARE with Max Luevano MD  
[facility] Appt Note: 3 month follow-up on weight; 3 month follow-up on weight [address] [facility] [address] [facility] [address]  
220 [facility] [address]  
  
    
 7/20/2018 11:00 AM  
Any with Jesu Solano MD  
[address] [place] [address] ([facility]) Appt Note: YRLY CPAP f/u- bring machine [address] [facility] [address]  
  
   
 [address] [facility] [address] [place] [id number] Upcoming Health Maintenance Date Due DTaP/Tdap/Td series (1 - Tdap) 8/15/1971 GLAUCOMA SCREENING Q2Y 8/15/2015 FOBT Q 1 YEAR AGE 50-75 3/25/2016 MEDICARE YEARLY EXAM 7/22/2017 INFLUENZA AGE 9 TO ADULT 8/1/2017 BREAST CANCER SCRN MAMMOGRAM 8/12/2018 Pneumococcal 65+ High/Highest Risk (2 of 2 - PPSV23) 6/1/2019 Allergies as of 7/25/2017  Review Complete On: 7/25/2017 By: Marisela Rollins MD  
  
 Severity Noted Reaction Type Reactions Haldol [Haloperidol Lactate]  08/26/2014    Other (comments) Current Immunizations  Reviewed on 3/11/2015 Name Date Influenza High Dose Vaccine PF 8/22/2016 Influenza Vaccine 10/15/2014 Influenza Vaccine Split 10/13/2010 Pneumococcal Conjugate (PCV-13) 7/21/2016 Pneumococcal Vaccine (Unspecified Type) 6/1/2014 Zoster Vaccine, Live 8/22/2016 Not reviewed this visit Vitals BP Pulse Resp Height(growth percentile) Weight(growth percentile) BMI 122/70 79 18 5' 5\" (1.651 m) 264 lb (119.7 kg) 43.93 kg/m2 OB Status Smoking Status Hysterectomy Never Smoker Vitals History BMI and BSA Data Body Mass Index Body Surface Area  
 43.93 kg/m 2 2.34 m 2 Preferred Pharmacy Pharmacy Name Phone [facility] Sneha Hernandez, [address] [place] [address] Pauline [facility] [phone] Your Updated Medication List  
  
   
This list is accurate as of: 7/25/17  3:12 PM.  Always use your most recent med list.  
  
  
  
  
 albuterol 90 mcg/actuation inhaler Commonly known as:  PROAIR HFA Take 1 Puff by inhalation every four (4) hours as needed. Indications: BRONCHOSPASM PREVENTION  
  
 anastrozole 1 mg tablet Commonly known as:  ARIMIDEX  
take 1 tablet by mouth once daily  
  
 aspirin delayed-release 81 mg tablet Take 1 Tab by mouth daily. baclofen 10 mg tablet Commonly known as:  LIORESAL  
take 1/2 tablet by mouth twice a day  
  
 benztropine 1 mg tablet Commonly known as:  COGENTIN Take 1 mg by mouth two (2) times a day. divalproex  mg tablet Commonly known as:  DEPAKOTE Take 1 Tab by mouth two (2) times a day. FIORICET -40 mg per capsule Generic drug:  butalbital-acetaminophen-caff Take  by mouth every four (4) hours as needed for Pain. FLUoxetine 10 mg capsule Commonly known as:  PROzac TAKE ONE CAPSULE BY MOUTH EVERY DAY  
  
 gabapentin 300 mg capsule Commonly known as:  NEURONTIN Take 1 Cap by mouth two (2) times a day. inhalational spacing device 1 Each by Does Not Apply route as needed. ivermectin 3 mg tablet Commonly known as:  STROMECTOL  
  
 loratadine 10 mg tablet Commonly known as:  [place] Pain Take 1 Tab by mouth daily as needed for Allergies. miconazole 2 % topical ointment Commonly known as:  Sherrie Marquez Apply  to affected area two (2) times a day. potassium chloride 40 mEq/15 mL Liqd Commonly known as:  KAON 20% take 7.5 milliliters by mouth once daily for HYPOKALEMIA PREVENTION  
  
 risperiDONE 2 mg tablet Commonly known as:  RisperDAL Take 1 Tab by mouth two (2) times a day. triamcinolone acetonide 0.1 % ointment Commonly known as:  KENALOG Apply  to affected area two (2) times a day. use thin layer as needed for itching. Follow-up Instructions Return in about 4 months (around 11/25/2017). To-Do List   
 07/31/2017 8:30 PM  
  Appointment with BEDROOM 5 [facility] at [facility] ([phone]) 1. Do not take a nap the day of the study  2. No caffeine after 12 noon the day of the study  3. Bring a 2 piece sleeping garment  4. Hair should be clean and dry, no oils, sprays, powders and remove wigs, weaves or other hair accessories  6. Patient should eat dinner prior to arriving for the test and a light breakfast will be provided upon discharge in the morning  7. Patient encouraged to bring activity items such as books, magazines, laptop, IPAD, etc, as well as toiletry items needed for the next morning  8. Bring all medications with you to the center  9. For specific questions please contact the sleep center directly, [phone] to 5p  10. Do not arrive more than 15 minutes prior to appt time and use the doorbell to enter the sleep center. 08/08/2017 10:30 AM  
  Appointment with [facility] GAYATHRI 3 at [address] [place] [address] ([phone]) Shower or bathe using soap and water. Do not use deodorant, powder, perfumes, or lotion the day of your exam.  If your prior mammograms were not performed at [facility] 6 please bring films with you or forward prior images 2 days before your procedure. Check in at registration 15min before your appointment time unless you were instructed to do otherwise. A script is not necessary, but if you have one, please bring it on the day of the mammogram or have it faxed to the department. [facility] [phone] [facility]  [phone] [facility]  [phone] [facility] [phone] [place] [address] [facility] Aden Carter [phone] Patient Instructions PRESCRIPTION REFILL POLICY [facility] Statement to Patients April 1, 2014 In an effort to ensure the large volume of patient prescription refills is processed in the most efficient and expeditious manner, we are asking our patients to assist us by calling your Pharmacy for all prescription refills, this will include also your  Mail Order Pharmacy. The pharmacy will contact our office electronically to continue the refill process. Please do not wait until the last minute to call your pharmacy. We need at least 48 hours (2days) to fill prescriptions. We also encourage you to call your pharmacy before going to  your prescription to make sure it is ready. With regard to controlled substance prescription refill requests (narcotic refills) that need to be picked up at our office, we ask your cooperation by providing us with at least 72 hours (3days) notice that you will need a refill. We will not refill narcotic prescription refill requests after 4:00pm on any weekday, Monday through Thursday, or after 2:00pm on Fridays, or on the weekends. We encourage everyone to explore another way of getting your prescription refill request processed using [employer], our patient web portal through our electronic medical record system. [employer] is an efficient and effective way to communicate your medication request directly to the office and  downloadable as an yomi on your smart phone . [employer] also features a review functionality that allows you to view your medication list as well as leave messages for your physician. Are you ready to get connected? If so please review the attatched instructions or speak to any of our staff to get you set up right away! Thank you so much for your cooperation.  Should you have any questions please contact our Practice Administrator. The Physicians and Staff,  [facility] Please bring all medication bottles, including vitamins, supplements and any over-the-counter medications, to your next office visit. Introducing [facility] & HEALTH SERVICES! Dear Margie Booth: Thank you for requesting a [employer] account. Our records indicate that you already have an active [employer] account. You can access your account anytime at [email or web address] Did you know that you can access your hospital and ER discharge instructions at any time in [employer]? You can also review all of your test results from your hospital stay or ER visit. Additional Information If you have questions, please visit the Frequently Asked Questions section of the [employer] website at [email or web address] Remember, [employer] is NOT to be used for urgent needs. For medical emergencies, dial 911. Now available from your iPhone and Android! Please provide this summary of care documentation to your next provider. Your primary care clinician is listed as Brianna Parada. If you have any questions after today's visit, please call [phone].

## 2017-07-25 NOTE — PATIENT INSTRUCTIONS
[facility]   Statement to Patients  April 1, 2014      In an effort to ensure the large volume of patient prescription refills is processed in the most efficient and expeditious manner, we are asking our patients to assist us by calling your Pharmacy for all prescription refills, this will include also your  Mail Order Pharmacy. The pharmacy will contact our office electronically to continue the refill process. Please do not wait until the last minute to call your pharmacy. We need at least 48 hours (2days) to fill prescriptions. We also encourage you to call your pharmacy before going to  your prescription to make sure it is ready. With regard to controlled substance prescription refill requests (narcotic refills) that need to be picked up at our office, we ask your cooperation by providing us with at least 72 hours (3days) notice that you will need a refill. We will not refill narcotic prescription refill requests after 4:00pm on any weekday, Monday through Thursday, or after 2:00pm on Fridays, or on the weekends. We encourage everyone to explore another way of getting your prescription refill request processed using [employer], our patient web portal through our electronic medical record system. [employer] is an efficient and effective way to communicate your medication request directly to the office and  downloadable as an yomi on your smart phone . [employer] also features a review functionality that allows you to view your medication list as well as leave messages for your physician. Are you ready to get connected? If so please review the attatched instructions or speak to any of our staff to get you set up right away! Thank you so much for your cooperation. Should you have any questions please contact our Practice Administrator.     The Physicians and Staff,  [facility]           Please bring all medication bottles, including vitamins, supplements and any over-the-counter medications, to your next office visit.

## 2017-07-25 NOTE — PROGRESS NOTES
Neurology Progress Note    HISTORY PROVIDED BY: patient    Chief Complaint:   Chief Complaint   Patient presents with    Seizure     f/u      Subjective:   Pt is a 77 y.o. 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 Acute on chronic c/o bilateral LE numbness in feet, progressing up legs to buttocks at times, and increasing difficulties with imbalance and ambulation. Exam with stooped posture, mild orobuccal dyskinesias, right resting tremor, markedly dec vibration to ankle, dec reflexes in LE, steady gait with walker with normal stride. Worsening gait likely multifactorial with deconditioning, wt gain contributing, but does have severe central and neuroforaminal stenosis seen on CT of the L-spine in July 2015 and NCS/EMG 1/4/16 reported as revealing peripheral polyneuropathy, unclear etiology, possibly due to chemotherapy. Recommended MRI of the L-spine without contrast to reassess lumbar spine disease. Patient was encouraged to continue her physical therapy and exercise at the gym. Continued Depakote 500 mg twice daily. She returns for f/u. MRI L-Spine 6/9/17 reviewed in PACS, severe spinal stenosis, referred to [facility] and seen by Dr. Elena Hughes on 7/21/17. She went to the visit with her sister. She recalls being told that they were going to do an epidural steroid injection to see if this helps her pain. She reports no wt loss reflected on the scale, but clothes are fitting more loosely. She does have JULIAN and swears she is using her BiPAP nightly. She is [place] [facility] [place] repeat PSG scheduled. On further questioning, she is only using her machine 4 hours a night because she only sleeps 4 hours a night. She has tried pushing bedtime to 11 - 11:30, but still not going right to sleep.   Wakes up and gets out of bed at 4AM, showers dresses and makes breakfast.  Takes a 15-20 minute nap in the morning, naps again 3-4PM for about 30 minutes. No seizures since last visit. Note from Dr. Constancia Riedel 7/21/17 reviewed -notes a congenitally narrow spinal canal with epidural lipomatosis with spondylitic changes causing radiographically significant stenosis. It was felt that some of her symptoms are likely related to her spinal stenosis particularly back pain, buttock pain, and numbness running down her legs especially in upper legs. Dr. Amador Ko was in agreement that her gait disturbance is likely multifactorial.  He discussed surgery, not a great candidate due to size and medical comorbidities, potential complications, patient expressed her hesitancy to proceed with surgery. They discussed an epidural steroid injection to see how much of her symptoms are helped by this if she has a good response then would be more comfortable proceeding with surgery. 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     Fall 2/2/2015    Gait disturbance     Hyperlipidemia     Hypertension     Infiltrating ductal carcinoma of breast ([address]     s/p partial right mastectomy 4/21/11 s/p XRT and chemotherapy    Peripheral neuropathy ([address]     NCS/EMG 1/4/16 - peripheral polyneuropathy    Psychiatric disorder     Depression    Rhabdomyolysis     Schizoaffective disorder     Sepsis ([address]     Stroke ([address]     Syncope 11/30/2014    Tardive dyskinesia     Temporal lobe lesion     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 Mastectomy partial    COLONOSCOPY N/A 11/3/2016    COLONOSCOPY performed by Christina Holter.  Estuardo Painting MD at [address] [place] [address]      left temporal lobe cyst resection    HX ORTHOPAEDIC  7/2005    left ankle arthroscopic    HX TOTAL ABDOMINAL HYSTERECTOMY  1980      Social History     Social History    Marital status:      Spouse name: N/A    Number of children: N/A    Years of education: N/A     Occupational History    Not on file. Social History Main Topics    Smoking status: Never Smoker    Smokeless tobacco: Never Used    Alcohol use No    Drug use: No    Sexual activity: Not Currently     Partners: Male     Other Topics Concern    Not on file     Social History Narrative    Lives alone     Family History   Problem Relation Age of Onset    Heart Disease Mother     Hypertension Mother     Breast Cancer Mother      Dec 54yo    Cancer Sister     No Known Problems Son           Objective:   ROS:  Per HPI-  Otherwise 10 point ROS was negative    Allergies   Allergen Reactions    Haldol [Haloperidol Lactate] Other (comments)       Meds:  Outpatient Medications Prior to Visit   Medication Sig Dispense Refill    ivermectin (STROMECTOL) 3 mg tablet   0    loratadine (CLARITIN) 10 mg tablet Take 1 Tab by mouth daily as needed for Allergies. 30 Tab 2    triamcinolone acetonide (KENALOG) 0.1 % ointment Apply  to affected area two (2) times a day. use thin layer as needed for itching. 30 g 0    miconazole (MICONTIN) 2 % topical ointment Apply  to affected area two (2) times a day. 1 Tube 0    divalproex DR (DEPAKOTE) 500 mg tablet Take 1 Tab by mouth two (2) times a day. 180 Tab 1    potassium chloride (KAON 20%) 40 mEq/15 mL liqd take 7.5 milliliters by mouth once daily for HYPOKALEMIA PREVENTION 480 mL 1    baclofen (LIORESAL) 10 mg tablet take 1/2 tablet by mouth twice a day 90 Tab 1    inhalational spacing device 1 Each by Does Not Apply route as needed. 1 Device 0    albuterol (PROAIR HFA) 90 mcg/actuation inhaler Take 1 Puff by inhalation every four (4) hours as needed.  Indications: BRONCHOSPASM PREVENTION 1 Inhaler 0    benztropine (COGENTIN) 1 mg tablet Take 1 mg by mouth two (2) times a day.  0    anastrozole (ARIMIDEX) 1 mg tablet take 1 tablet by mouth once daily 90 Tab 0    FLUoxetine (PROZAC) 10 mg capsule TAKE ONE CAPSULE BY MOUTH EVERY DAY 90 Cap 0    risperiDONE (RISPERDAL) 2 mg tablet Take 1 Tab by mouth two (2) times a day. 180 Tab 0    aspirin delayed-release 81 mg tablet Take 1 Tab by mouth daily. 30 Tab 3    gabapentin (NEURONTIN) 300 mg capsule Take 1 Cap by mouth two (2) times a day. 180 Cap 0    miconazole (MICOTIN) 2 % topical cream apply to affected area twice a day  0    furosemide (LASIX) 20 mg tablet Take 20 mg by mouth every other day.  0    naproxen (NAPROSYN) 500 mg tablet   0    ibuprofen (MOTRIN) 800 mg tablet Take 1 Tab by mouth every eight (8) hours as needed for Pain. 20 Tab 0    HYDROcodone-acetaminophen (NORCO) 5-325 mg per tablet Take 1 Tab by mouth every six (6) hours as needed for Pain. Max Daily Amount: 4 Tabs. 8 Tab 0    polyethylene glycol (MIRALAX) 17 gram/dose powder Take 8.5 g by mouth daily. 850 g 5     No facility-administered medications prior to visit. Imaging:  MRI Results (most recent):    Results from Hospital Encounter encounter on 06/09/17   MRI LUMB SPINE WO CONT   Narrative EXAM:  MRI LUMB SPINE WO CONT    INDICATION:  Chronic low back pain and difficulty walking. COMPARISON: CT lumbar spine on 7/24/2015    TECHNIQUE: MR imaging of the lumbar spine was performed using the following  sequences: sagittal T1, T2, STIR;  axial T1, T2 performed on the open 0.7 Nelly  magnet. CONTRAST:  None. FINDINGS: For the purposes of this dictation, the L5-S1 disc space is on series  11, image 16. However, the lumbar spine counting on this case is difficult. There are 6 lumbar type vertebral bodies and a fully formed disc at S1-S2.  If  any intervention or surgery is planned, correlation between the numbering used  for this study with any plain films and fluoroscopic findings should be  performed. Congenitally slender lumbar spinal canal measures 12 mm in AP diameter at L5. In  addition, there is epidural lipomatosis. There is normal alignment of the lumbar spine. Vertebral body heights are  maintained. Marrow signal is normal. Disc signal is within normal limits. The conus medullaris terminates at L2-L3. Signal and caliber of the distal  spinal cord are within normal limits. The paraspinal soft tissues are within normal limits. Lower thoracic spine: Moderate-severe central spinal canal stenosis at T10-T11. Moderate central spinal canal stenosis at T11-T12. L1-L2:  No herniation or stenosis. L2-L3:  Diffuse disc bulge. No stenosis. L3-L4:  Diffuse disc bulge, facet arthrosis, and thickened ligamentum flavum. Moderate central spinal canal stenosis. Moderate bilateral foraminal stenosis. L4-L5:  Diffuse disc bulge, facet arthrosis, and thickened ligamentum flavum. Severe central spinal canal stenosis. Moderate bilateral foraminal stenosis. L5-S1:  Diffuse disc bulge, facet arthrosis, and thickened ligamentum flavum. Severe central spinal canal stenosis. Severe left and moderate right foraminal  stenosis. S1-S2: Diffuse disc bulge, facet arthrosis, and thickened ligamentum flavum. Mild central spinal canal stenosis. Mild left foraminal stenosis. Impression IMPRESSION:    1. Increased degenerative disc disease is superimposed on a congenitally slender  spinal canal and epidural lipomatosis. 2. 6 lumbar type vertebral bodies with counting detailed above. Please note that  the labeling system is different than the comparison CT from 2015.  3. Severe central spinal canal stenosis L4-5 and L5-S1.  4. Moderate central spinal canal stenosis at L3-4.  5. At least moderate central spinal canal stenosis in the lower thoracic spine. EPIC email sent to Dr. Butch Stark at the time of the dictation.        CT Results (most recent):    Results from [facility] Encounter encounter on 09/19/16   CT ABD PELV W CONT   Narrative EXAM: CT ABDOMEN PELVIS WITH CONTRAST    INDICATION:  Chronic nonspecific lower abdominal pain, right greater than left,  evaluate for mass. COMPARISON: None. CONTRAST: 97 mL of Isovue-370. TECHNIQUE:   Multislice helical CT was performed from the diaphragm to the symphysis pubis  during uneventful rapid bolus intravenous contrast administration. Oral contrast  was also administered. Contiguous 5 mm axial images were reconstructed and  lung and soft tissue windows were generated. Coronal and sagittal reformations  were generated. CT dose reduction was achieved through use of a standardized  protocol tailored for this examination and automatic exposure control for dose  modulation. FINDINGS:          LOWER CHEST: There is left lower lobe airspace disease or atelectasis and there  are small bilateral pleural effusions. ABDOMEN:  Liver: The liver is normal in size and contour with no focal abnormality. The  attenuation of the liver is decreased throughout. Gallbladder and bile ducts: There is no calcified gallstone or biliary  dilatation. Spleen: No abnormality. Pancreas: No abnormality. Adrenal glands: No abnormality. Kidneys: No abnormality. PELVIS:  Reproductive organs: The uterus is absent. Bladder: No abnormality. BOWEL AND MESENTERY: The small bowel is normal.  There is no mesenteric mass or  adenopathy. The appendix is absent. .  There is some stool throughout the colon. PERITONEUM: There is no ascites or free intraperitoneal air. RETROPERITONEUM: The aorta is atherosclerotic and tapers without aneurysm. There  is no retroperitoneal adenopathy or mass. There is no pelvic mass or adenopathy. BONES AND SOFT TISSUES: The bones and soft tissues of the abdominal wall are  within normal limits. The patient is obese. Impression IMPRESSION:   1.  No mass, adenopathy or other acute abdominal or pelvic abnormality. 2. Status post hysterectomy. 3. Status post appendectomy. 4. Obesity with hepatic steatosis. 5. Mild atherosclerosis of the abdominal aorta without aneurysm. .               Reviewed records in [employer] and media tab today    Lab Review   Results for orders placed or performed in visit on 04/19/17   LIPID PANEL   Result Value Ref Range    Cholesterol, total 166 100 - 199 mg/dL    Triglyceride 67 0 - 149 mg/dL    HDL Cholesterol 56 >39 mg/dL    VLDL, calculated 13 5 - 40 mg/dL    LDL, calculated 97 0 - 99 mg/dL   METABOLIC PANEL, COMPREHENSIVE   Result Value Ref Range    Glucose 87 65 - 99 mg/dL    BUN 23 8 - 27 mg/dL    Creatinine 0.75 0.57 - 1.00 mg/dL    BUN/Creatinine ratio 31 (H) 12 - 28    Sodium 144 134 - 144 mmol/L    Potassium 4.7 3.5 - 5.2 mmol/L    Chloride 105 96 - 106 mmol/L    CO2 22 18 - 29 mmol/L    Calcium 9.4 8.7 - 10.3 mg/dL    Protein, total 7.3 6.0 - 8.5 g/dL    Albumin 4.0 3.6 - 4.8 g/dL    GLOBULIN, TOTAL 3.3 1.5 - 4.5 g/dL    A-G Ratio 1.2 1.2 - 2.2    Bilirubin, total <0.2 0.0 - 1.2 mg/dL    Alk. phosphatase 72 39 - 117 IU/L    AST (SGOT) 16 0 - 40 IU/L    ALT (SGPT) 13 0 - 32 IU/L        Exam:  Visit Vitals    /70    Pulse 79    Resp 18    Ht 5' 5\" (1.651 m)    Wt 119.7 kg (264 lb)    BMI 43.93 kg/m2     General:  Alert, cooperative, no distress. Head:  Normocephalic, without obvious abnormality, atraumatic. Respiratory:  Heart:   Non labored breathing  Regular rate and rhythm, no murmurs   Extrem: 2+ edema bilateral LE       Pulses: 2+ radial pulses. Neurologic:  MS: Alert and oriented x 4, speech intact. Language intact. Attention and fund of knowledge appropriate. Recent and remote memory intact.   Cranial Nerves:  II: visual fields Full to confrontation   II: pupils    II: optic disc    III,VII: ptosis none   III,IV,VI: extraocular muscles  EOMI, no nystagmus or diplopia   V: facial light touch sensation     VII: facial muscle function   symmetric   VIII: hearing intact   IX: soft palate elevation     XI: trapezius strength     XI: sternocleidomastoid strength    XII: tongue       Motor: normal bulk and tone, bilateral dyskinesias in hands, mild orobuccal dyskinesias  Strength: 5/5 throughout, no PD  Sensory: (At last visit -Markedly dec to vibration at ankles)  Coordination: FTN and RAYMOND intact, HTS limited due to body habitus, but intact  Gait: stooped posture, normal gait with rolling walker  Reflexes: 2+ symmetric in UE, unable to elicit in LE, possibly due to body habitus           Assessment/Plan   Pt is a 77 y.o. 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 Acute on chronic c/o bilateral LE numbness in feet, progressing up legs to buttocks at times, and increasing difficulties with imbalance and ambulation, found to have severe spinal stenosis on MRI L-Spine 6/9/17. Exam with stooped posture, mild orobuccal and hand dyskinesias, dec reflexes in LE, steady gait with walker with normal stride. Discussed spinal stenosis and worsening gait due to this, but also multifactorial with deconditioning, wt gain contributing, and possibly PN that was seen on NCS/EMG 1/4/16, unclear etiology, possibly due to chemotherapy. Discussed her sleep issues. Instructed to stop napping during the day so that she can get more consolidated sleep at night and qualify to keep her BiPAP, which she is using as instructed, but only sleeping for 4 hours a night. If she must take a nap during the day, instructed to use her BiPAP. Continue Depakote 500 mg twice daily. F/u in clinic in 4 months, instructed to call in the interim if needed. ICD-10-CM ICD-9-CM    1. Seizure disorder ([facility] G40.909 345.90    2. Gait disturbance R26.9 781.2    3. Spinal stenosis of lumbar region M48.06 724.02        Signed:   Brandon Montesinos MD  7/25/2017

## 2017-07-25 NOTE — PROGRESS NOTES
Patient here for follow up on seizures. No seizures since last visit in May 2017. No falls since last office visit.

## 2017-07-26 NOTE — PROGRESS NOTES
Rm#1  REFILL ON POTASSIUM   Chief Complaint   Patient presents with    Weight Management     3 month f/u; 1 banana and 1 boost for breakfast.  pt thought she was only supposed to eat 1 meal daily to loose weight      1. Have you been to the ER, urgent care clinic since your last visit? Hospitalized since your last visit? No    2. Have you seen or consulted any other health care providers outside of the [address] [place] [address] since your last visit? Include any pap smears or colon screening. Yes When: 7/24/17 Where: Dr. Padmini Zuniga  Reason for visit: back doctor   Dr. Weston Ramirez neurologist   Health Maintenance Due   Topic Date Due    DTaP/Tdap/Td series (1 - Tdap) 08/15/1971    GLAUCOMA SCREENING Q2Y  08/15/2015    FOBT Q 1 YEAR AGE 50-75  03/25/2016    MEDICARE YEARLY EXAM  07/22/2017     Pt states shes due for eye exam   Due for medicare wellness   PHQ over the last two weeks 7/26/2017   Little interest or pleasure in doing things Not at all   Feeling down, depressed or hopeless Not at all   Total Score PHQ 2 0     Fall Risk Assessment, last 12 mths 7/26/2017   Able to walk? Yes   Fall in past 12 months? No   Fall with injury? -   Number of falls in past 12 months -   Fall Risk Score -     Abuse Screening Questionnaire 7/26/2017   Do you ever feel afraid of your partner? N   Are you in a relationship with someone who physically or mentally threatens you? N   Is it safe for you to go home?  Y     ADL Assessment 7/26/2017   Feeding yourself No Help Needed   Getting from bed to chair No Help Needed   Getting dressed No Help Needed   Bathing or showering No Help Needed   Walk across the room (includes cane/walker) No Help Needed   Using the telphone No Help Needed   Taking your medications No Help Needed   Preparing meals Help Needed   Managing money (expenses/bills) No Help Needed   Moderately strenuous housework (laundry) Help Needed   Shopping for personal items (toiletries/medicines) No Help Needed Shopping for groceries No Help Needed   Driving Help Needed   Climbing a flight of stairs No Help Needed   Getting to places beyond walking distances No Help Needed

## 2017-07-26 NOTE — MR AVS SNAPSHOT
Visit Information Date & Time Provider Department Dept. Phone Encounter #  
 7/26/2017  8:15 AM Bhavana Morales MD [address] [facility]  Follow-up Instructions Return in about 4 months (around 11/26/2017) for follow-up blood pressure. Your Appointments 11/21/2017  2:20 PM  
Follow Up with MD Shakir Jacinto [facility] at [address]) Appt Note: 4 month f/u seizures NN 7/25/17  
 [facility] [address]  
[phone]  
  
   
 [address] [place] [address] [address]  
  
    
 7/20/2018 11:00 AM  
Any with Becky Chambers MD  
[address] [facility] ([address] [place] [address]) Appt Note: YRLY CPAP f/u- bring machine [address] [facility] [address]  
  
   
 [address] [facility] [address] [place] [phone] Upcoming Health Maintenance Date Due DTaP/Tdap/Td series (1 - Tdap) 8/15/1971 GLAUCOMA SCREENING Q2Y 8/15/2015 MEDICARE YEARLY EXAM 7/22/2017 INFLUENZA AGE 9 TO ADULT 8/1/2017 BREAST CANCER SCRN MAMMOGRAM 8/12/2018 Pneumococcal 65+ High/Highest Risk (2 of 2 - PPSV23) 6/1/2019 COLON CANCER SCRN (BARIUM / CT COLO / FLX SIG) Q5 11/3/2021 Allergies as of 7/26/2017  Review Complete On: 7/26/2017 By: Ubaldo Re, LPN Severity Noted Reaction Type Reactions Haldol [Haloperidol Lactate]  08/26/2014    Other (comments) Current Immunizations  Reviewed on 3/11/2015 Name Date Influenza High Dose Vaccine PF 8/22/2016 Influenza Vaccine 10/15/2014 Influenza Vaccine Split 10/13/2010 Pneumococcal Conjugate (PCV-13) 7/21/2016 Pneumococcal Polysaccharide (PPSV-23)  Incomplete Pneumococcal Vaccine (Unspecified Type) 6/1/2014 Tdap 7/1/2017 Zoster Vaccine, Live 8/22/2016 Not reviewed this visit You Were Diagnosed With   
  
 Codes Comments Frequent urination    -  Primary ICD-10-CM: R35.0 ICD-9-CM: 788.41 Routine general medical examination at a health care facility     ICD-10-CM: Z00.00 ICD-9-CM: V70.0 Screening for alcoholism     ICD-10-CM: Z13.89 ICD-9-CM: V79.1 Encounter for immunization     ICD-10-CM: C47 ICD-9-CM: V03.89 Schizoaffective disorder, unspecified type ([facility] 75.)     ICD-10-CM: F25.9 ICD-9-CM: 295.70 Seizure disorder ([facility] 75.)     ICD-10-CM: G40.909 ICD-9-CM: 345.90 Chronic obstructive pulmonary disease, unspecified COPD type ([place] [address].)     ICD-10-CM: J44.9 ICD-9-CM: 320 Essential hypertension     ICD-10-CM: I10 
ICD-9-CM: 401.9 Localized edema     ICD-10-CM: R60.0 ICD-9-CM: 419. 3 Vitals BP Pulse Temp Resp Height(growth percentile) Weight(growth percentile) 128/76 (BP 1 Location: Left arm, BP Patient Position: Sitting) 77 97.6 °F (36.4 °C) (Oral) 24 5' 5\" (1.651 m) 261 lb (118.4 kg) SpO2 BMI OB Status Smoking Status 94% 43.43 kg/m2 Hysterectomy Never Smoker BMI and BSA Data Body Mass Index Body Surface Area  
 43.43 kg/m 2 2.33 m 2 Preferred Pharmacy [facility] Phone [facility] [address] [phone] Your Updated Medication List  
  
   
This list is accurate as of: 7/26/17  9:04 AM.  Always use your most recent med list.  
  
  
  
  
 albuterol 90 mcg/actuation inhaler Commonly known as:  PROAIR HFA Take 1 Puff by inhalation every four (4) hours as needed. Indications: BRONCHOSPASM PREVENTION  
  
 anastrozole 1 mg tablet Commonly known as:  ARIMIDEX  
take 1 tablet by mouth once daily  
  
 aspirin delayed-release 81 mg tablet Take 1 Tab by mouth daily. baclofen 10 mg tablet Commonly known as:  LIORESAL  
take 1/2 tablet by mouth twice a day  
  
 benztropine 1 mg tablet Commonly known as:  COGENTIN Take 1 mg by mouth two (2) times a day. divalproex  mg tablet Commonly known as:  DEPAKOTE Take 1 Tab by mouth two (2) times a day. FIORICET -40 mg per capsule Generic drug:  butalbital-acetaminophen-caff Take  by mouth every four (4) hours as needed for Pain. FLUoxetine 10 mg capsule Commonly known as:  PROzac TAKE ONE CAPSULE BY MOUTH EVERY DAY  
  
 furosemide 20 mg tablet Commonly known as:  LASIX Take 20 mg by mouth daily. gabapentin 300 mg capsule Commonly known as:  NEURONTIN Take 1 Cap by mouth two (2) times a day. inhalational spacing device 1 Each by Does Not Apply route as needed. loratadine 10 mg tablet Commonly known as:  [place] Found Take 1 Tab by mouth daily as needed for Allergies. naproxen 500 mg tablet Commonly known as:  NAPROSYN  
take 1 tablet by mouth twice a day  
  
 potassium chloride 40 mEq/15 mL Liqd Commonly known as:  KAON 20%  
take 7.5 milliliters by mouth once daily for HYPOKALEMIA PREVENTION  
  
 risperiDONE 2 mg tablet Commonly known as:  RisperDAL Take 1 Tab by mouth two (2) times a day. triamcinolone acetonide 0.1 % ointment Commonly known as:  KENALOG Apply  to affected area two (2) times a day. use thin layer as needed for itching. We Performed the Following ADMIN PNEUMOCOCCAL VACCINE [ HCPCS] AMB POC URINALYSIS DIP STICK AUTO W/O MICRO [34846 CPT(R)] METABOLIC PANEL, BASIC [79403 CPT(R)] PNEUMOCOCCAL POLYSACCHARIDE VACCINE, 23-VALENT, ADULT OR IMMUNOSUPPRESSED PT DOSE, [95478 CPT(R)] Follow-up Instructions Return in about 4 months (around 11/26/2017) for follow-up blood pressure. To-Do List   
 07/31/2017 8:30 PM  
  Appointment with BEDROOM [address] [place] [address] at [address] ([phone]) 1. Do not take a nap the day of the study  2. No caffeine after 12 noon the day of the study  3. Bring a 2 piece sleeping garment  4.  Hair should be clean and dry, no oils, sprays, powders and remove wigs, weaves or other hair accessories  6. Patient should eat dinner prior to arriving for the test and a light breakfast will be provided upon discharge in the morning  7. Patient encouraged to bring activity items such as books, magazines, laptop, IPAD, etc, as well as toiletry items needed for the next morning  8. Bring all medications with you to the center  9. For specific questions please contact the sleep center directly, [phone] to [phone]  10. Do not arrive more than 15 minutes prior to appt time and use the doorbell to enter the sleep center. 08/08/2017 10:30 AM  
  Appointment with [facility] [address] at [address] [place] [address] ([phone]) Shower or bathe using soap and water. Do not use deodorant, powder, perfumes, or lotion the day of your exam.  If your prior mammograms were not performed at [facility] 6 please bring films with you or forward prior images 2 days before your procedure. Check in at registration 15min before your appointment time unless you were instructed to do otherwise. A script is not necessary, but if you have one, please bring it on the day of the mammogram or have it faxed to the department. [facility] [phone] [facility]  [phone] [facility]  [phone] [facility] [phone] [address] [phone] Patient Instructions Treated for scabies with ivermectin. Medicare Wellness Visit, Female The best way to live healthy is to have a healthy lifestyle by eating a well-balanced diet, exercising regularly, limiting alcohol and stopping smoking. Regular physical exams and screening tests are another way to keep healthy. Preventive exams provided by your health care provider can find health problems before they become diseases or illnesses. Preventive services including immunizations, screening tests, monitoring and exams can help you take care of your own health. All people over age 72 should have a pneumovax  and and a prevnar shot to prevent pneumonia. These are once in a lifetime unless you and your provider decide differently. All people over 65 should have a yearly flu shot and a tetanus vaccine every 10 years. A bone mass density to screen for osteoporosis or thinning of the bones should be done every 2 years after 65. Screening for diabetes mellitus with a blood sugar test should be done every year. Glaucoma is a disease of the eye due to increased ocular pressure that can lead to blindness and it should be done every year by an eye professional. 
 
Cardiovascular screening tests that check for elevated lipids (fatty part of blood) which can lead to heart disease and strokes should be done every 5 years. Colorectal screening that evaluates for blood or polyps in your colon should be done yearly as a stool test or every five years as a flexible sigmoidoscope or every 10 years as a colonoscopy up to age 76. Breast cancer screening with a mammogram is recommended biennially  for women age 54-69. Screening for cervical cancer with a pap smear and pelvic exam is recommended for women after age 72 years every 2 years up to age 79 or when the provider and patient decide to stop. If there is a history of cervical abnormalities or other increased risk for cancer then the test is recommended yearly. Hepatitis C screening is also recommended for anyone born between 80 through [address]. A shingles vaccine is also recommended once in a lifetime after age 61. Your Medicare Wellness Exam is recommended annually. Here is a list of your current Health Maintenance items with a due date: 
Health Maintenance Due Topic Date Due  
 DTaP/Tdap/Td  (1 - Tdap) 08/15/1971  Glaucoma Screening   08/15/2015  Stool testing for trace blood  03/25/2016  Annual Well Visit  07/22/2017  
 
- Please be sure to follow-up with your eye doctor How to Read a Food Label to Limit Sodium: Care Instructions Your Care Instructions Sodium causes your body to hold on to extra water. This can raise your blood pressure and force your heart and kidneys to work harder. In very serious cases, this could cause you to be put in the hospital. It might even be life-threatening. By limiting sodium, you will feel better and lower your risk of serious problems. Processed foods, fast food, and restaurant foods are the major sources of dietary sodium. The most common name for sodium is salt. Try to limit how much sodium you eat to less than 2,300 milligrams (mg) a day. If you limit your sodium to 1,500 mg a day, you can lower your blood pressure even more. This limit counts all the salt that you eat in foods you cook or in packaged foods. Keep a list of everything you eat and drink. Follow-up care is a key part of your treatment and safety. Be sure to make and go to all appointments, and call your doctor if you are having problems. It's also a good idea to know your test results and keep a list of the medicines you take. How can you care for yourself at home? Read ingredient lists on food labels · Read the list of ingredients on food labels to help you find how much sodium is in a food. The label lists the ingredients in a food in descending order (from the most to the least). If salt or sodium is high on the list, there may be a lot of sodium in the food. · Know that sodium has different names. Sodium is also called monosodium glutamate (MSG, common in [facility] food), sodium citrate, sodium alginate, sodium hydroxide, and sodium phosphate. Read Nutrition Facts labels · On most foods, there is a Nutrition Facts label. This will tell you how much sodium is in one serving of food. Look at both the serving size and the sodium amount. The serving size is located at the top of the label, usually right under the \"Nutrition Facts\" title.  The amount of sodium is given in the list under the title. It is given in milligrams (mg). ¨ Check the serving size carefully. A single serving is often very small, and you may eat more than one serving. If this is the case, you will eat more sodium than listed on the label. For example, if the serving size for a canned soup is 1 cup and the sodium amount is 470 mg, if you have 2 cups you will eat 940 mg of sodium. · The nutrition facts for fresh fruits and vegetables are not listed on the food. They may be listed somewhere in the store. These foods usually have no sodium or low sodium. · The Nutrition Facts label also gives you the Percent Daily Value for sodium. This is how much of the recommended amount of sodium a serving contains. The daily value for sodium is less than 2,300 mg. So if the Percent Daily Value says 50%, this means one serving is giving you half of this, or 1,150 mg. Buy low-sodium foods · Look for foods that are made with less sodium. Watch for the following words on the label. ¨ \"Unsalted\" means there is no sodium added to the food. But there may be sodium already in the food naturally. ¨ \"Sodium-free\" means a serving has less than 5 mg of sodium. ¨ \"Very low sodium\" means a serving has 35 mg or less of sodium. ¨ \"Low-sodium\" means a serving has 140 mg or less of sodium. · \"Reduced-sodium\" means that there is 25% less sodium than what the food normally has. This is still usually too much sodium. Try not to buy foods with this on the label. · Buy fresh vegetables, or frozen vegetables without added sauces. Buy low-sodium versions of canned vegetables, soups, and other canned goods. Where can you learn more? Go to http://rose-jaiden.info/. Enter 26 [id number] in the search box to learn more about \"How to Read a Food Label to Limit Sodium: Care Instructions. \" Current as of: July 26, 2016 Content Version: 11.3 © 7579-7720 [employer], [facility].  Care instructions adapted under license by [address] (which disclaims liability or warranty for this information). If you have questions about a medical condition or this instruction, always ask your healthcare professional. [address] any warranty or liability for your use of this information. Introducing [facility]! Dear Mitzi Young: Thank you for requesting a Buzzni account. Our records indicate that you already have an active Buzzni account. You can access your account anytime at [email or web address]. Did you know that you can access your hospital and ER discharge instructions at any time in Buzzni? You can also review all of your test results from your hospital stay or ER visit. Additional Information If you have questions, please visit the Frequently Asked Questions section of the Buzzni website at [email or web address]. Remember, Buzzni is NOT to be used for urgent needs. For medical emergencies, dial 911. Now available from your iPhone and Android! Please provide this summary of care documentation to your next provider. Your primary care clinician is listed as Shorty Helms. If you have any questions after today's visit, please call [phone].

## 2017-07-26 NOTE — PATIENT INSTRUCTIONS
Treated for scabies with ivermectin. Medicare Wellness Visit, Female    The best way to live healthy is to have a healthy lifestyle by eating a well-balanced diet, exercising regularly, limiting alcohol and stopping smoking. Regular physical exams and screening tests are another way to keep healthy. Preventive exams provided by your health care provider can find health problems before they become diseases or illnesses. Preventive services including immunizations, screening tests, monitoring and exams can help you take care of your own health. All people over age 72 should have a pneumovax  and and a prevnar shot to prevent pneumonia. These are once in a lifetime unless you and your provider decide differently. All people over 65 should have a yearly flu shot and a tetanus vaccine every 10 years. A bone mass density to screen for osteoporosis or thinning of the bones should be done every 2 years after 65. Screening for diabetes mellitus with a blood sugar test should be done every year. Glaucoma is a disease of the eye due to increased ocular pressure that can lead to blindness and it should be done every year by an eye professional.    Cardiovascular screening tests that check for elevated lipids (fatty part of blood) which can lead to heart disease and strokes should be done every 5 years. Colorectal screening that evaluates for blood or polyps in your colon should be done yearly as a stool test or every five years as a flexible sigmoidoscope or every 10 years as a colonoscopy up to age 76. Breast cancer screening with a mammogram is recommended biennially  for women age 54-69. Screening for cervical cancer with a pap smear and pelvic exam is recommended for women after age 72 years every 2 years up to age 79 or when the provider and patient decide to stop. If there is a history of cervical abnormalities or other increased risk for cancer then the test is recommended yearly. Hepatitis C screening is also recommended for anyone born between 80 through [address]. A shingles vaccine is also recommended once in a lifetime after age 61. Your Medicare Wellness Exam is recommended annually. Here is a list of your current Health Maintenance items with a due date:  Health Maintenance Due   Topic Date Due    DTaP/Tdap/Td  (1 - Tdap) 08/15/1971    Glaucoma Screening   08/15/2015    Stool testing for trace blood  03/25/2016    Annual Well Visit  07/22/2017     - Please be sure to follow-up with your eye doctor         How to Read a Food Label to Limit Sodium: Care Instructions  Your Care Instructions  Sodium causes your body to hold on to extra water. This can raise your blood pressure and force your heart and kidneys to work harder. In very serious cases, this could cause you to be put in the hospital. It might even be life-threatening. By limiting sodium, you will feel better and lower your risk of serious problems. Processed foods, fast food, and restaurant foods are the major sources of dietary sodium. The most common name for sodium is salt. Try to limit how much sodium you eat to less than 2,300 milligrams (mg) a day. If you limit your sodium to 1,500 mg a day, you can lower your blood pressure even more. This limit counts all the salt that you eat in foods you cook or in packaged foods. Keep a list of everything you eat and drink. Follow-up care is a key part of your treatment and safety. Be sure to make and go to all appointments, and call your doctor if you are having problems. It's also a good idea to know your test results and keep a list of the medicines you take. How can you care for yourself at home? Read ingredient lists on food labels  · Read the list of ingredients on food labels to help you find how much sodium is in a food. The label lists the ingredients in a food in descending order (from the most to the least).  If salt or sodium is high on the list, there may be a lot of sodium in the food. · Know that sodium has different names. Sodium is also called monosodium glutamate (MSG, common in [facility] food), sodium citrate, sodium alginate, sodium hydroxide, and sodium phosphate. Read Nutrition Facts labels  · On most foods, there is a Nutrition Facts label. This will tell you how much sodium is in one serving of food. Look at both the serving size and the sodium amount. The serving size is located at the top of the label, usually right under the \"Nutrition Facts\" title. The amount of sodium is given in the list under the title. It is given in milligrams (mg). ¨ Check the serving size carefully. A single serving is often very small, and you may eat more than one serving. If this is the case, you will eat more sodium than listed on the label. For example, if the serving size for a canned soup is 1 cup and the sodium amount is 470 mg, if you have 2 cups you will eat 940 mg of sodium. · The nutrition facts for fresh fruits and vegetables are not listed on the food. They may be listed somewhere in the store. These foods usually have no sodium or low sodium. · The Nutrition Facts label also gives you the Percent Daily Value for sodium. This is how much of the recommended amount of sodium a serving contains. The daily value for sodium is less than 2,300 mg. So if the Percent Daily Value says 50%, this means one serving is giving you half of this, or 1,150 mg. Buy low-sodium foods  · Look for foods that are made with less sodium. Watch for the following words on the label. ¨ \"Unsalted\" means there is no sodium added to the food. But there may be sodium already in the food naturally. ¨ \"Sodium-free\" means a serving has less than 5 mg of sodium. ¨ \"Very low sodium\" means a serving has 35 mg or less of sodium. ¨ \"Low-sodium\" means a serving has 140 mg or less of sodium. · \"Reduced-sodium\" means that there is 25% less sodium than what the food normally has.  This is still usually too much sodium. Try not to buy foods with this on the label. · Buy fresh vegetables, or frozen vegetables without added sauces. Buy low-sodium versions of canned vegetables, soups, and other canned goods. Where can you learn more? Go to http://belinda.info/. Enter 26 [id number] in the search box to learn more about \"How to Read a Food Label to Limit Sodium: Care Instructions. \"  Current as of: July 26, 2016  Content Version: 11.3  © 6924-4188 [employer]. Care instructions adapted under license by "[employer]" (which disclaims liability or warranty for this information). If you have questions about a medical condition or this instruction, always ask your healthcare professional. [address] any warranty or liability for your use of this information.

## 2017-07-26 NOTE — PROGRESS NOTES
OLIVIA Ambriz is a 77 y.o. female, she presents today for:    Since last visit has started taking naproxen, prescribed by foot doctor Prudence Enriquez. Reports that she is trying to follow low salt diet. However will eat canned foods. To see Dr. Medhat Peña, dermatologist tomorrow. No new complaints today. Continues to have frequent urination. PMH/PSH: reviewed and updated  Sochx:  reports that she has never smoked. She has never used smokeless tobacco. She reports that she does not drink alcohol or use illicit drugs. Famhx: reviewed and updated     All: Allergies   Allergen Reactions    Haldol [Haloperidol Lactate] Other (comments)     Med:   Current Outpatient Prescriptions   Medication Sig    furosemide (LASIX) 20 mg tablet Take 20 mg by mouth daily.  naproxen (NAPROSYN) 500 mg tablet take 1 tablet by mouth twice a day    PREVNAR 13, PF, 0.5 mL syrg injection inject 0.5 milliliter intramuscularly    butalbital-acetaminophen-caff (FIORICET) -40 mg per capsule Take  by mouth every four (4) hours as needed for Pain.  loratadine (CLARITIN) 10 mg tablet Take 1 Tab by mouth daily as needed for Allergies.  triamcinolone acetonide (KENALOG) 0.1 % ointment Apply  to affected area two (2) times a day. use thin layer as needed for itching.  divalproex DR (DEPAKOTE) 500 mg tablet Take 1 Tab by mouth two (2) times a day.  potassium chloride (KAON 20%) 40 mEq/15 mL liqd take 7.5 milliliters by mouth once daily for HYPOKALEMIA PREVENTION    baclofen (LIORESAL) 10 mg tablet take 1/2 tablet by mouth twice a day    inhalational spacing device 1 Each by Does Not Apply route as needed.  albuterol (PROAIR HFA) 90 mcg/actuation inhaler Take 1 Puff by inhalation every four (4) hours as needed. Indications: BRONCHOSPASM PREVENTION    benztropine (COGENTIN) 1 mg tablet Take 1 mg by mouth two (2) times a day.     anastrozole (ARIMIDEX) 1 mg tablet take 1 tablet by mouth once daily    FLUoxetine (PROZAC) 10 mg capsule TAKE ONE CAPSULE BY MOUTH EVERY DAY    risperiDONE (RISPERDAL) 2 mg tablet Take 1 Tab by mouth two (2) times a day.  aspirin delayed-release 81 mg tablet Take 1 Tab by mouth daily.  gabapentin (NEURONTIN) 300 mg capsule Take 1 Cap by mouth two (2) times a day.  BOOSTRIX TDAP 2.5-8-5 Lf-mcg-Lf/0.5mL syrg inject 0.5 milliliter intramuscularly     No current facility-administered medications for this visit. Review of Systems   Constitutional: Negative for chills, fever and malaise/fatigue. HENT: Negative for congestion. Respiratory: Negative for shortness of breath. Cardiovascular: Negative for chest pain. Gastrointestinal: Negative for constipation and diarrhea. Genitourinary: Negative for dysuria. PE:  Blood pressure 128/76, pulse 77, temperature 97.6 °F (36.4 °C), temperature source Oral, resp. rate 24, height 5' 5\" (1.651 m), weight 261 lb (118.4 kg), SpO2 94 %. Body mass index is 43.43 kg/(m^2). Physical Exam   Constitutional: She is oriented to person, place, and time. She appears well-developed and well-nourished. No distress. HENT:   Head: Normocephalic. Mouth/Throat: Oropharynx is clear and moist.   Eyes: Conjunctivae and EOM are normal.   Neck: Neck supple. Cardiovascular: Normal rate, regular rhythm and normal heart sounds. Pulmonary/Chest: Effort normal and breath sounds normal.   Abdominal: Soft. Musculoskeletal: She exhibits edema (1+ bilateral and equal). Neurological: She is alert and oriented to person, place, and time. Skin: Skin is warm and dry. Nursing note and vitals reviewed. Labs:   See addendum    A/P:  77 y.o. female    ICD-10-CM ICD-9-CM    1. Routine general medical examination at a health care facility Z00.00 V70.0    2. Screening for alcoholism Z13.89 V79.1    3.  Encounter for immunization Z23 V03.89 ADMIN PNEUMOCOCCAL VACCINE      PNEUMOCOCCAL POLYSACCHARIDE VACCINE, 23-VALENT, ADULT OR IMMUNOSUPPRESSED PT DOSE,       COPD: well controlled. Continue to work on breathing exercises/pulmonary toilet.      Sleep apnea: seen in sleep medicine clinic. Has cpap back. Needs to wear more frequently      HTN: BP borderline controlled today. Encouarged increased exercise and calorie restriction for weight loss. - continues to take lasix     Obese: weight has declined over past month. Continue to watch. Patient is becoming more stable as resources have become available.      Schizoaffective disorder/Tardative dyskinesia: inpatient in 2009 and 2010 for psychiatric reasons. Last seen by psych in our systemin December 2014. Note at that time indicates care at [facility]. Not currently under psychiatric care.     - has re-established with [facility].  (Dr. Ruchi Mayorga). Feels thins are going well  - TD imporved. - She was given AVS and expressed understanding with the diagnosis and plan as discussed. This is an Initial Medicare Annual Wellness Exam (AWV) (Performed 12 months after IPPE or effective date of Medicare Part B enrollment, Once in a lifetime)    I have reviewed the patient's medical history in detail and updated the computerized patient record.      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     Fall 2/2/2015    Gait disturbance     Hyperlipidemia     Hypertension     Infiltrating ductal carcinoma of breast ([address]     s/p partial right mastectomy 4/21/11 s/p XRT and chemotherapy    Peripheral neuropathy ([address]     NCS/EMG 1/4/16 - peripheral polyneuropathy    Psychiatric disorder     Depression    Rhabdomyolysis     Schizoaffective disorder     Sepsis ([address]     Stroke ([address]     Syncope 11/30/2014    Tardive dyskinesia     Temporal lobe lesion     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 Mastectomy partial    COLONOSCOPY N/A 11/3/2016    COLONOSCOPY performed by Wayne Sandoval. Kalyn Kruse MD at [facility] ENDOSCOPY    HX CRANIOTOMY      left temporal lobe cyst resection    HX ORTHOPAEDIC  7/2005    left ankle arthroscopic    HX TOTAL ABDOMINAL HYSTERECTOMY  1980     Current Outpatient Prescriptions   Medication Sig Dispense Refill    furosemide (LASIX) 20 mg tablet Take 20 mg by mouth daily. 0    naproxen (NAPROSYN) 500 mg tablet take 1 tablet by mouth twice a day  0    butalbital-acetaminophen-caff (FIORICET) -40 mg per capsule Take  by mouth every four (4) hours as needed for Pain.  loratadine (CLARITIN) 10 mg tablet Take 1 Tab by mouth daily as needed for Allergies. 30 Tab 2    triamcinolone acetonide (KENALOG) 0.1 % ointment Apply  to affected area two (2) times a day. use thin layer as needed for itching. 30 g 0    divalproex DR (DEPAKOTE) 500 mg tablet Take 1 Tab by mouth two (2) times a day. 180 Tab 1    potassium chloride (KAON 20%) 40 mEq/15 mL liqd take 7.5 milliliters by mouth once daily for HYPOKALEMIA PREVENTION 480 mL 1    baclofen (LIORESAL) 10 mg tablet take 1/2 tablet by mouth twice a day 90 Tab 1    inhalational spacing device 1 Each by Does Not Apply route as needed. 1 Device 0    albuterol (PROAIR HFA) 90 mcg/actuation inhaler Take 1 Puff by inhalation every four (4) hours as needed. Indications: BRONCHOSPASM PREVENTION 1 Inhaler 0    benztropine (COGENTIN) 1 mg tablet Take 1 mg by mouth two (2) times a day.  0    anastrozole (ARIMIDEX) 1 mg tablet take 1 tablet by mouth once daily 90 Tab 0    FLUoxetine (PROZAC) 10 mg capsule TAKE ONE CAPSULE BY MOUTH EVERY DAY 90 Cap 0    risperiDONE (RISPERDAL) 2 mg tablet Take 1 Tab by mouth two (2) times a day. 180 Tab 0    aspirin delayed-release 81 mg tablet Take 1 Tab by mouth daily.  30 Tab 3    gabapentin (NEURONTIN) 300 mg capsule Take 1 Cap by mouth two (2) times a day. 180 Cap 0     Allergies   Allergen Reactions    Haldol [Haloperidol Lactate] Other (comments)     Family History   Problem Relation Age of Onset    Heart Disease Mother     Hypertension Mother     Breast Cancer Mother      Dec 56yo    Cancer Sister     No Known Problems Son      Social History   Substance Use Topics    Smoking status: Never Smoker    Smokeless tobacco: Never Used    Alcohol use No     Patient Active Problem List   Diagnosis Code    Hypertension I10    Tardive dyskinesia G24.01    Schizoaffective disorder ([address] F25.9    Infiltrating ductal carcinoma of breast ([address] C50.919    Seizure disorder ([address] G40.909    Gait disturbance R26.9    Edema R60.9    T1 vertebral fracture ([address] S22.019A    Spinal stenosis of lumbar region M48.06    Facial droop R29.810    Osteopenia M85.80    Chronic abdominal pain R10.9, G89.29    Chronic obstructive pulmonary disease (HCC) J44.9    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 No    Do you average more than 7 drinks per week? No    Functional Ability and Level of Safety:     Hearing Loss   normal-to-mild    Activities of Daily Living   Self-care. Requires assistance with: no ADLs    Fall Risk   Fall Risk Assessment, last 12 mths 7/26/2017   Able to walk? Yes   Fall in past 12 months? No   Fall with injury? -   Number of falls in past 12 months -   Fall Risk Score -     Abuse Screen   Patient is not abused    Review of Systems   A comprehensive review of systems was negative except for that written in the HPI.     Physical Examination     Evaluation of Cognitive Function:  Mood/affect:  neutral  Appearance: age appropriate  Family member/caregiver input: none present    Patient Care Team:  Tata Mitchell MD as PCP - General (Internal Medicine)  Basim Garcia MD (Hematology and Oncology)  Eliseo Butts MD (Neurology)    Advice/Referrals/Counseling   Education and counseling provided:  Are appropriate based on today's review and evaluation  Pneumococcal Vaccine  Colorectal cancer screening tests    Assessment/Plan       ICD-10-CM ICD-9-CM    1. Frequent urination R35.0 788.41 AMB POC URINALYSIS DIP STICK AUTO W/O MICRO   2. Routine general medical examination at a health care facility Z00.00 V70.0    3. Screening for alcoholism Z13.89 V79.1    4. Encounter for immunization Z23 V03.89 ADMIN PNEUMOCOCCAL VACCINE      PNEUMOCOCCAL POLYSACCHARIDE VACCINE, 23-VALENT, ADULT OR IMMUNOSUPPRESSED PT DOSE,   5. Schizoaffective disorder, unspecified type ([facility] F25.9 295.70    6. Seizure disorder ([facility] G40.909 345.90    7. Chronic obstructive pulmonary disease, unspecified COPD type ([facility] J44.9 496    8. Essential hypertension I10 401.9    9. Localized edema N65.4 158.6 METABOLIC PANEL, BASIC     Medicare wellness and Well woman (non-gyn) exam: history and exam revealed issues as noted below. Cancer screening: pap done today and last unknown, cervix present on exam, no specific abnormalities. Breast exam notable only for prior scars. Mammo ordered, on anastrozole, states she has followed up with breast surgeon. Colon: compelted barium enema and modified flex sig 11/2016  Vaccine status: pneumonia vaccine today. Cardiovascular risk: BP well controlled, lipid screen may 2016  Bone health: dexa borderline in 2016 for osteopenia, plan to repeat in 2 years. Diet and Exercise: see below    Labs drawn and plan to follow-up chronic conditions at next visit. Follow-up Disposition:  Return in about 4 months (around 11/26/2017) for follow-up blood pressure.

## 2017-07-31 NOTE — TELEPHONE ENCOUNTER
----- Message from Lashae Gutierrez sent at 7/31/2017  3:18 PM EDT -----  Regarding: Dr. Yelitza Chirinos Telephone  Pt was returning phone call she received today 7/31.  [phone]

## 2017-08-01 NOTE — TELEPHONE ENCOUNTER
Spoke with patient. Informed her that I did not see where anyone from our office had called her. Patient asked if Dr. Cheryle Duck was going to call her. Writer advised that I did not know who Dr. Cheryle Duck was. Writer also advised that, upon chart review, her PCP may have contacted her yesterday. Patient was given an opportunity to ask questions, repeated information, and verbalized understanding.

## 2017-08-01 NOTE — TELEPHONE ENCOUNTER
Results of Sleep Testing, PAP titration and follow-up discussed with patient. Patient encouraged to call if there were any further questions regarding sleep symptoms. Encounter Diagnoses   Name Primary?  Complex sleep apnea syndrome Yes    Chronic obstructive pulmonary disease, unspecified COPD type ([facility]     BMI 40.0-44.9, adult ([facility]        Orders Placed This Encounter    SLEEP LAB (PAP TITRATION)     Perform TCO2 monitoring during titration. Standing Status:   Future     Standing Expiration Date:   1/30/2018     Scheduling Instructions:      Perform Bi-Level S/T with AVAPS titration - starting settings:      Max pressure: 30 cmH2O;       Minimum EPAP: 4 cmH2O; Minimum IPAP: 8;       Tidal Volume: Starting 360 ml, Maximum 500 ml; Rate: 12.      Increase EPAP to treat Obstructive Apnea. Increase Tidal Volume in increments of 30 ml to treat Hypopneas / Hypoxemia.      Order Specific Question:   Reason for Exam     Answer:   CSA

## 2017-10-02 NOTE — TELEPHONE ENCOUNTER
Encounter Diagnoses   Name Primary?  Complex sleep apnea syndrome Yes    Chronic obstructive pulmonary disease, unspecified COPD type ([facility]     Obesity hypoventilation syndrome ([facility]      Orders Placed This Encounter    AMB SUPPLY ORDER     Diagnosis: Sleep Apnea : ICD-10 Code (G47.31)    Positive Airway Pressure Therapy: Duration of need: 99 months. Respironics Device: Bi-Level S/T with AVAPS  Tidal Volume: 360 ml; AVAPS Rate: 10  Max pressure: 25 cmH2O; Minimum IPAP: 6  cmH2O; Minimum  EPAP: 4 cmH2O. CPAP mask - As fitted during titration OR patient preference, headgear, heated tubing, and filters; heated humidifier; wireless modem. Remote monitoring enrollment. Lor Hickman MD, FAASM; NPI: [id number]  Electronically signed.  10/02/17

## 2017-10-04 NOTE — TELEPHONE ENCOUNTER
Jami from [address] [facility] called stating that the patient needs to have a face to face appointment in order for her new AVAPs device to be covered. Called and scheduled patient for 10/19/17. Office notes from 10/19/17 need to be faxed over to [address] [facility].

## 2017-10-17 NOTE — PROGRESS NOTES
Rm#1  Refill on claritin and fiorocet   Insurance isnt vitana cover   risperdal   fluoxedine  Cephalexin? ??  Loratadine     Chief Complaint   Patient presents with    Form Completion     dmv form      1. Have you been to the ER, urgent care clinic since your last visit? Hospitalized since your last visit? No    2. Have you seen or consulted any other health care providers outside of the [address] [place] [address] since your last visit? Include any pap smears or colon screening. jaytry    States she had epidural couplde weeks ago from a dr. [facility] Maintenance Due   Topic Date Due    GLAUCOMA SCREENING Q2Y  08/15/2015     Fall Risk Assessment, last 12 mths 10/17/2017   Able to walk? Yes   Fall in past 12 months?  No   Fall with injury? -   Number of falls in past 12 months -   Fall Risk Score -

## 2017-10-17 NOTE — PROGRESS NOTES
HPI   Francisca Weller is a 79 y.o. female, she presents today for:    Would like to start driving. By her memory she was     [phone] - counsellor. [phone])  [address] psychiatrist    Called her medicare to see why cost of medication was going up. Notes that she is doing better than before with CPAP machine. Labs scheduled with psychiatrist at [address] [facility] (fasting) November. PMH/PSH: reviewed and updated  Sochx:  reports that she has never smoked. She has never used smokeless tobacco. She reports that she does not drink alcohol or use illicit drugs. Famhx: reviewed and updated     All: Allergies   Allergen Reactions    Haldol [Haloperidol Lactate] Other (comments)     Med:   Current Outpatient Prescriptions   Medication Sig    FLUAD 1625-1149, 65 YR UP,,PF, syrg injection inject 0.5 milliliter intramuscularly    baclofen (LIORESAL) 10 mg tablet take 1/2 tablet by mouth twice a day    triamcinolone acetonide (KENALOG) 0.1 % ointment Apply  to affected area two (2) times a day. use thin layer as needed for itching.  furosemide (LASIX) 20 mg tablet Take 20 mg by mouth daily.  naproxen (NAPROSYN) 500 mg tablet take 1 tablet by mouth twice a day    butalbital-acetaminophen-caff (FIORICET) -40 mg per capsule Take  by mouth every four (4) hours as needed for Pain.  loratadine (CLARITIN) 10 mg tablet Take 1 Tab by mouth daily as needed for Allergies.  divalproex DR (DEPAKOTE) 500 mg tablet Take 1 Tab by mouth two (2) times a day.  potassium chloride (KAON 20%) 40 mEq/15 mL liqd take 7.5 milliliters by mouth once daily for HYPOKALEMIA PREVENTION    inhalational spacing device 1 Each by Does Not Apply route as needed.  albuterol (PROAIR HFA) 90 mcg/actuation inhaler Take 1 Puff by inhalation every four (4) hours as needed. Indications: BRONCHOSPASM PREVENTION    benztropine (COGENTIN) 1 mg tablet Take 1 mg by mouth two (2) times a day.     anastrozole (ARIMIDEX) 1 mg tablet take 1 tablet by mouth once daily    FLUoxetine (PROZAC) 10 mg capsule TAKE ONE CAPSULE BY MOUTH EVERY DAY    risperiDONE (RISPERDAL) 2 mg tablet Take 1 Tab by mouth two (2) times a day.  aspirin delayed-release 81 mg tablet Take 1 Tab by mouth daily.  gabapentin (NEURONTIN) 300 mg capsule Take 1 Cap by mouth two (2) times a day. No current facility-administered medications for this visit. Review of Systems   Constitutional: Negative for chills, fever and malaise/fatigue. Respiratory: Negative for shortness of breath. Cardiovascular: Negative for chest pain. PE:  Blood pressure 149/80, pulse 86, temperature 98.6 °F (37 °C), temperature source Oral, resp. rate 12, height 5' 5\" (1.651 m), weight 266 lb (120.7 kg), SpO2 93 %. Body mass index is 44.26 kg/(m^2). Physical Exam   Constitutional: She is oriented to person, place, and time. No distress. HENT:   Head: Normocephalic. Mouth/Throat: Oropharynx is clear and moist.   Eyes: Conjunctivae and EOM are normal.   Neck: Neck supple. Cardiovascular: Normal rate, regular rhythm and normal heart sounds. Pulmonary/Chest: Effort normal and breath sounds normal.   Lymphadenopathy:     She has no cervical adenopathy. Neurological: She is alert and oriented to person, place, and time. Lip smacking and pill rolling tremor. Continues to have delayed reaction time. Skin: Skin is warm and dry. Nursing note and vitals reviewed. Musck: able to turn head ~ 60 degrees right and left. Labs:   No results found for any visits on 10/17/17. A/P:  79 y.o. female    ICD-10-CM ICD-9-CM    1. Schizoaffective disorder, unspecified type ([address] F25.9 295.70    2. Chronic obstructive pulmonary disease, unspecified COPD type ([facility] J44.9 496    3. Tardive dyskinesia G24.01 333.85    4. Gait disturbance R26.9 781.2    5. Neck pain M54.2 723.1      Encouarged follow-up with SAÚL.    Discussed that I will fill out [facility] paperwork, but that I feel she has too much delay in reaction time to be a safe MV . She was frustrated with answer but expressed understanding of my choice. She did not agree with assessement. I do agree that patient has come a long way in functionality since we first met. - She was given AVS and expressed understanding with the diagnosis and plan as discussed.   Future Appointments  Date Time Provider Lisandro Connolly   10/31/2017 10:20 AM Merline Prose, MD [employer] [facility] MARTHA SCHED   11/21/2017 2:20 PM Anahi Sawyer MD [facility] MARTHA SCHED   12/11/2017 10:45 AM Chaz Luevano MD [facility] MARTHA SCHED   7/20/2018 11:00 AM Merline Prose, MD JACK[facility] [employer] [facility] [address]

## 2017-10-17 NOTE — MR AVS SNAPSHOT
Visit Information Date & Time Provider Department Dept. Phone Encounter #  
 10/17/2017  4:00 PM Mackenzie Samuels MD [address] [facility] [phone] [id number] Your Appointments 10/31/2017 10:20 AM  
Any with Roz Jiménez MD  
[address] [facility] ([facility]) Appt Note: Face to face for AVAPS to be covered; Face to face for AVAPS to be covered; rs  
 [address] [facility] [address] [address]  
[phone]  
  
   
 [address] [facility] [address] [place] [id number]  
  
    
 11/21/2017  2:20 PM  
Follow Up with Corazon Baldwin MD  
[facility] Appt Note: 4 month f/u seizures NN 7/25/17  
 [address] [place] [address]  
[phone]  
  
   
 [address] [facility] [address]  
  
    
 12/11/2017 10:45 AM  
ROUTINE CARE with Mackenzie Samuels MD  
[facility]) Appt Note: 4mo f/u for BP; patient wcb to  when she has a calendar; per pt/ r/s appt/jnm [address] [facility] [address] [facility] [address]  
[address] [facility] [address]  
  
    
 7/20/2018 11:00 AM  
Any with Roz Jiménez MD  
[address] [place] [address] ([facility]) Appt Note: YRLY CPAP f/u- bring machine [address] [address]  
[phone] Upcoming Health Maintenance Date Due  
 GLAUCOMA SCREENING Q2Y 8/15/2015 MEDICARE YEARLY EXAM 7/27/2018 Pneumococcal 65+ High/Highest Risk (2 of 2 - PPSV23) 6/1/2019 BREAST CANCER SCRN MAMMOGRAM 8/8/2019 COLON CANCER SCRN (BARIUM / CT COLO / FLX SIG) Q5 11/3/2021 DTaP/Tdap/Td series (2 - Td) 7/1/2027 Allergies as of 10/17/2017  Review Complete On: 10/17/2017 By: Shruti Villeda LPN Severity Noted Reaction Type Reactions Haldol [Haloperidol Lactate]  08/26/2014    Other (comments) Current Immunizations  Reviewed on 8/11/2017 Name Date Influenza High Dose Vaccine PF 8/22/2016 Influenza Vaccine 8/7/2017, 10/15/2014 Influenza Vaccine Split 10/13/2010 Pneumococcal Conjugate (PCV-13) 7/21/2016 Pneumococcal Vaccine (Unspecified Type) 6/1/2014 Tdap 7/1/2017 Zoster Vaccine, Live 8/22/2016 Not reviewed this visit Vitals BP Pulse Temp Resp Height(growth percentile) Weight(growth percentile) 149/80 (BP 1 Location: Left arm, BP Patient Position: Sitting) 86 98.6 °F (37 °C) (Oral) 12 5' 5\" (1.651 m) 266 lb (120.7 kg) SpO2 BMI OB Status Smoking Status 93% 44.26 kg/m2 Hysterectomy Never Smoker BMI and BSA Data Body Mass Index Body Surface Area  
 44.26 kg/m 2 2.35 m 2 Preferred Pharmacy Pharmacy Name Phone [facility] [address] [place] [address] Stanley Yogesh [phone] Your Updated Medication List  
  
   
This list is accurate as of: 10/17/17  4:53 PM.  Always use your most recent med list.  
  
  
  
  
 albuterol 90 mcg/actuation inhaler Commonly known as:  PROAIR HFA Take 1 Puff by inhalation every four (4) hours as needed. Indications: BRONCHOSPASM PREVENTION  
  
 anastrozole 1 mg tablet Commonly known as:  ARIMIDEX  
take 1 tablet by mouth once daily  
  
 aspirin delayed-release 81 mg tablet Take 1 Tab by mouth daily. baclofen 10 mg tablet Commonly known as:  LIORESAL  
take 1/2 tablet by mouth twice a day  
  
 benztropine 1 mg tablet Commonly known as:  COGENTIN Take 1 mg by mouth two (2) times a day. divalproex  mg tablet Commonly known as:  DEPAKOTE Take 1 Tab by mouth two (2) times a day. FIORICET -40 mg per capsule Generic drug:  butalbital-acetaminophen-caff Take  by mouth every four (4) hours as needed for Pain. FLUAD [id number] (65 YR UP)(PF) Syrg injection Generic drug:  influenza vaccine 2017-18 (65 yrs+)(PF)  
inject 0.5 milliliter intramuscularly FLUoxetine 10 mg capsule Commonly known as:  PROzac TAKE ONE CAPSULE BY MOUTH EVERY DAY  
  
 furosemide 20 mg tablet Commonly known as:  LASIX Take 20 mg by mouth daily. gabapentin 300 mg capsule Commonly known as:  NEURONTIN Take 1 Cap by mouth two (2) times a day. inhalational spacing device 1 Each by Does Not Apply route as needed. loratadine 10 mg tablet Commonly known as:  Shellye Drape Take 1 Tab by mouth daily as needed for Allergies. naproxen 500 mg tablet Commonly known as:  NAPROSYN  
take 1 tablet by mouth twice a day  
  
 potassium chloride 40 mEq/15 mL Liqd Commonly known as:  KAON 20%  
take 7.5 milliliters by mouth once daily for HYPOKALEMIA PREVENTION  
  
 risperiDONE 2 mg tablet Commonly known as:  RisperDAL Take 1 Tab by mouth two (2) times a day. triamcinolone acetonide 0.1 % ointment Commonly known as:  KENALOG Apply  to affected area two (2) times a day. use thin layer as needed for itching. Introducing [facility]! Dear Lakeisha Soto: Thank you for requesting a [employer] account. Our records indicate that you already have an active [employer] account. You can access your account anytime at [email or web address] Did you know that you can access your hospital and ER discharge instructions at any time in [employer]? You can also review all of your test results from your hospital stay or ER visit. Additional Information If you have questions, please visit the Frequently Asked Questions section of the [employer] website at [email or web address] Remember, [employer] is NOT to be used for urgent needs. For medical emergencies, dial 911. Now available from your iPhone and Android! Please provide this summary of care documentation to your next provider. Your primary care clinician is listed as Rachel Mendes. If you have any questions after today's visit, please call [phone].

## 2017-10-31 NOTE — PROGRESS NOTES
[address] [facility]. [place], [address]  Tel.  [phone]  Fax. 100 [facility] 60  [place], [address] [facility]  Tel.  [phone]  Fax. [phone] [address] [facility] Jimmy Mirza  Tel.  [phone]  Fax. [phone]     S>Daxa Bailon is a 79 y.o. female seen for a positive airway pressure follow-up. She reports problems using the device. She is 40% compliant over the past 30 days. The following problems are identified:    Drowsiness no Problems exhaling yes   Snoring no Forget to put on no   Mask Comfortable yes Can't fall asleep no   Dry Mouth no Mask falls off no   Air Leaking yes Frequent awakenings yes       She admits that her sleep has not improved. She reports of persistent difficulties falling asleep and staying asleep on Bi-Level PAP therapy. Reports of pressure being uncomfortable - feels therapy on night of recent sleep test was more comfortable. Allergies   Allergen Reactions    Haldol [Haloperidol Lactate] Other (comments)       She has a current medication list which includes the following prescription(s): clotrimazole, cephalexin, ivermectin, baclofen, triamcinolone acetonide, furosemide, naproxen, loratadine, divalproex dr, inhalational spacing device, albuterol, benztropine, anastrozole, fluoxetine, risperidone, aspirin delayed-release, gabapentin, fluad [id number] (65 yr up)(pf), butalbital-acetaminophen-caff, and potassium chloride. .      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     [place] Sleepiness Score: 4   and Modified F.O.S.Q. Score Total / 2: 19.5      O>    Visit Vitals    /89    Pulse 83    Ht 5' 5\" (1.651 m)    Wt 267 lb 12.8 oz (121.5 kg)    SpO2 92%    BMI 44.56 kg/m2         General:   Not in acute distress   Eyes:  Anicteric sclerae, no obvious strabismus   Nose:  No obvious nasal septum deviation    Oropharynx:   Class 4 oropharyngeal outlet, thick tongue base, uvula not seen due to low-lying soft palate, narrow tonsilo-pharyngeal pilars   Tonsils:   tonsils are not visualized due to low-lying soft palate   Neck:   midline trachea   Chest/Lungs:  Equal lung expansion, clear on auscultation    CVS:  Normal rate, regular rhythm; no JVD   Skin:  Warm to touch; no obvious rashes   Neuro:  No focal deficits ; no obvious tremor    Psych:  Normal affect,  normal countenance;           A>    ICD-10-CM ICD-9-CM    1. Complex sleep apnea syndrome G47.31 327.21    2. Chronic obstructive pulmonary disease, unspecified COPD type ([facility] J44.9 496    3. BMI 40.0-44.9, adult ([facility] Z68.41 V85.41    4. Essential hypertension I10 401.9      AHI = 32.9. On Bi - Level :  14.8 cmH2O. Compliant:      no    Therapeutic Response:  Negative    P>    * Change to AVAPS Therapy due to Bi-Level PAP failure as reported above and due to persistent elevation in AHI: 9.8 on download (see media). * We have recommended a dedicated weight loss through appropriate diet and an exercise regiment as significant weight reduction has been shown to reduce severity of obstructive sleep apnea. * Follow-up Disposition:  Return in about 3 months (around 1/31/2018), or if symptoms worsen or fail to improve. * She was asked to contact our office for any problems regarding PAP therapy. * Counseling was provided regarding the importance of regular PAP use and on proper sleep hygiene and safe driving. * Re-enforced proper and regular cleaning for the device.     Thank you for allowing us to participate in your patient's medical care. Chastity Valdes MD, FAASM  Electronically signed.  10/31/17

## 2017-10-31 NOTE — PATIENT INSTRUCTIONS
[address] [facility] [place]. [place], [address]  Tel.  [phone]  Fax. 100 [facility] 60  [place], [address] [facility]  Tel.  [phone]  Fax. [phone] [address]  Tel.  [phone]  Fax. [phone]     Learning About CPAP for Sleep Apnea  What is CPAP? CPAP is a small machine that you use at home every night while you sleep. It increases air pressure in your throat to keep your airway open. When you have sleep apnea, this can help you sleep better so you feel much better. CPAP stands for \"continuous positive airway pressure. \"  The CPAP machine will have one of the following:  · A mask that covers your nose and mouth  · Prongs that fit into your nose  · A mask that covers your nose only, the most common type. This type is called NCPAP. The N stands for \"nasal.\"  Why is it done? CPAP is usually the best treatment for obstructive sleep apnea. It is the first treatment choice and the most widely used. Your doctor may suggest CPAP if you have:  · Moderate to severe sleep apnea. · Sleep apnea and coronary artery disease (CAD) or heart failure. How does it help? · CPAP can help you have more normal sleep, so you feel less sleepy and more alert during the daytime. · CPAP may help keep heart failure or other heart problems from getting worse. · NCPAP may help lower your blood pressure. · If you use CPAP, your bed partner may also sleep better because you are not snoring or restless. What are the side effects? Some people who use CPAP have:  · A dry or stuffy nose and a sore throat. · Irritated skin on the face. · Sore eyes. · Bloating. If you have any of these problems, work with your doctor to fix them. Here are some things you can try:  · Be sure the mask or nasal prongs fit well. · See if your doctor can adjust the pressure of your CPAP. · If your nose is dry, try a humidifier.   · If your nose is runny or stuffy, try decongestant medicine or a steroid nasal spray. If these things do not help, you might try a different type of machine. Some machines have air pressure that adjusts on its own. Others have air pressures that are different when you breathe in than when you breathe out. This may reduce discomfort caused by too much pressure in your nose. Where can you learn more? Go to efw-suhl.be  Enter Sylvia Leahy in the search box to learn more about \"Learning About CPAP for Sleep Apnea. \"   © 2612-9792 Healthwise, Incorporated. Care instructions adapted under license by Luke Lee (which disclaims liability or warranty for this information). This care instruction is for use with your licensed healthcare professional. If you have questions about a medical condition or this instruction, always ask your healthcare professional. [address] any warranty or liability for your use of this information. Content Version: 7.2.59422; Last Revised: January 11, 2010  PROPER SLEEP HYGIENE    What to avoid  · Do not have drinks with caffeine, such as coffee or black tea, for 8 hours before bed. · Do not smoke or use other types of tobacco near bedtime. Nicotine is a stimulant and can keep you awake. · Avoid drinking alcohol late in the evening, because it can cause you to wake in the middle of the night. · Do not eat a big meal close to bedtime. If you are hungry, eat a light snack. · Do not drink a lot of water close to bedtime, because the need to urinate may wake you up during the night. · Do not read or watch TV in bed. Use the bed only for sleeping and sexual activity. What to try  · Go to bed at the same time every night, and wake up at the same time every morning. Do not take naps during the day. · Keep your bedroom quiet, dark, and cool. · Get regular exercise, but not within 3 to 4 hours of your bedtime. .  · Sleep on a comfortable pillow and mattress.   · If watching the clock makes you anxious, turn it facing away from you so you cannot see the time. · If you worry when you lie down, start a worry book. Well before bedtime, write down your worries, and then set the book and your concerns aside. · Try meditation or other relaxation techniques before you go to bed. · If you cannot fall asleep, get up and go to another room until you feel sleepy. Do something relaxing. Repeat your bedtime routine before you go to bed again. · Make your house quiet and calm about an hour before bedtime. Turn down the lights, turn off the TV, log off the computer, and turn down the volume on music. This can help you relax after a busy day. Drowsy Driving: The [employer] cites drowsiness as a causing factor in more than 856,009 police reported crashes annually, resulting in 76,000 injuries and 1,500 deaths. Other surveys suggest 55% of people polled have driven while drowsy in the past year, 23% had fallen asleep but not crashed, 3% crashed, and 2% had and accident due to drowsy driving. Who is at risk? Young Drivers: One study of drowsy driving accidents states that 55% of the drivers were under 25 years. Of those, 75% were male. Shift Workers and Travelers: People who work overnight or travel across time zones frequently are at higher risk of experiencing Circadian Rhythm Disorders. They are trying to work and function when their body is programed to sleep. Sleep Deprived: Lack of sleep has a serious impact on your ability to pay attention or focus on a task. Consistently getting less than the average of 8 hours your body needs creates partial or cumulative sleep deprivation. Untreated Sleep Disorders: Sleep Apnea, Narcolepsy, R.L.S., and other sleep disorders (untreated) prevent a person from getting enough restful sleep. This leads to excessive daytime sleepiness and increases the risk for drowsy driving accidents by up to 7 times.   Medications / Alcohol: Even over the counter medications can cause drowsiness. Medications that impair a drivers attention should have a warning label. Alcohol naturally makes you sleepy and on its own can cause accidents. Combined with excessive drowsiness its effects are amplified. Signs of Drowsy Driving:   * You don't remember driving the last few miles   * You may drift out of your yair   * You are unable to focus and your thoughts wander   * You may yawn more often than normal   * You have difficulty keeping your eyes open / nodding off   * Missing traffic signs, speeding, or tailgating  Prevention-   Good sleep hygiene, lifestyle and behavioral choices have the most impact on drowsy driving. There is no substitute for sleep and the average person requires 8 hours nightly. If you find yourself driving drowsy, stop and sleep. Consider the sleep hygiene tips provided during your visit as well. Medication Refill Policy: Refills for all medications require 1 week advance notice. Please have your pharmacy fax a refill request. We are unable to fax, or call in \"controled substance\" medications and you will need to pick these prescriptions up from our office. [employer] Activation    Thank you for requesting access to [employer]. Please follow the instructions below to securely access and download your online medical record. [employer] allows you to send messages to your doctor, view your test results, renew your prescriptions, schedule appointments, and more. How Do I Sign Up? 1. In your internet browser, go to [email or web address]. 2. Click on the First Time User? Click Here link in the Sign In box. You will see the New Member Sign Up page. 3. Enter your [employer] Access Code exactly as it appears below. You will not need to use this code after youve completed the sign-up process. If you do not sign up before the expiration date, you must request a new code. [employer] Access Code:  Activation code not generated  Current [email or web address] Status: Active (This is the date your [email or web address] access code will )    4. Enter the last four digits of your Social Security Number (xxxx) and Date of Birth (mm/dd/yyyy) as indicated and click Submit. You will be taken to the next sign-up page. 5. Create a [employer] ID. This will be your [email or web address] login ID and cannot be changed, so think of one that is secure and easy to remember. 6. Create a [email or web address] password. You can change your password at any time. 7. Enter your Password Reset Question and Answer. This can be used at a later time if you forget your password. 8. Enter your e-mail address. You will receive e-mail notification when new information is available in [address] 9. Click Sign Up. You can now view and download portions of your medical record. 10. Click the Download Summary menu link to download a portable copy of your medical information. Additional Information    If you have questions, please call [phone]. Remember, [email or web address] is NOT to be used for urgent needs. For medical emergencies, dial 911.

## 2017-11-21 NOTE — PROGRESS NOTES
Patient here for follow up on seizures and numbness and pain in bottom of feet. No seizures since last office visit.

## 2017-11-21 NOTE — PROGRESS NOTES
Neurology Progress Note    HISTORY PROVIDED BY: patient    Chief Complaint:   Chief Complaint   Patient presents with    Seizure     f/u    Numbness     bottom of both feet up to leg    Foot Pain     in both feet      Subjective:   Pt is a 79 y.o. 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 Acute on chronic c/o bilateral LE numbness in feet, progressing up legs to buttocks at times, and increasing difficulties with imbalance and ambulation, found to have severe spinal stenosis on MRI L-Spine 6/9/17. Exam with stooped posture, mild orobuccal and hand dyskinesias, dec reflexes in LE, steady gait with walker with normal stride. Discussed spinal stenosis and worsening gait due to this, but also multifactorial with deconditioning, wt gain contributing, and possibly PN that was seen on NCS/EMG 1/4/16, unclear etiology, possibly due to chemotherapy. Discussed her sleep issues. Instructed to stop napping during the day so that she can get more consolidated sleep at night and qualify to keep her BiPAP, which she is using as instructed, but only sleeping for 4 hours a night. If she must take a nap during the day, instructed to use her BiPAP. Continued Depakote 500 mg twice daily. She returns for f/u. No falls since last visit. Uses her walker at all times. No seizures. She has been back in to the sleep clinic and they are trouble shooting her difficulties with treatment for JULIAN. She has seen pain management, Dr. Halie Torre, on referral from Dr. Brandt Serrano, for steroid injections which improved her pain significantly. She tells me that she is not interested in surgery at all. She has DMV paperwork for me to complete. She tells me that she does not drive at all, doesn't even have a car, but does not want to give up her license.      Past Medical History: Diagnosis Date    Anemia 12/1/2014    ARF (acute renal failure) ([facility] 12/5/2014    Edema     Encephalopathy 12/5/2014    Epilep NEC w/o intr epil     Fall 2/2/2015    Gait disturbance     Hyperlipidemia     Hypertension     Infiltrating ductal carcinoma of breast ([facility]     s/p partial right mastectomy 4/21/11 s/p XRT and chemotherapy    Peripheral neuropathy     NCS/EMG 1/4/16 - peripheral polyneuropathy    Psychiatric disorder     Depression    Rhabdomyolysis     S/P radiation therapy 2010    rt. breast    Schizoaffective disorder     Sepsis ([facility]     [facility]roke [facility]     Syncope 11/30/2014    Tardive dyskinesia     Temporal lobe lesion     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 Mastectomy partial    COLONOSCOPY N/A 11/3/2016    COLONOSCOPY performed by Isidro Anderson. Taiwo Díaz MD at [address] [place] [address]      left temporal lobe cyst resection    HX ORTHOPAEDIC  7/2005    left ankle arthroscopic    HX TOTAL ABDOMINAL HYSTERECTOMY  1980      Social History     Social History    Marital status:      Spouse name: N/A    Number of children: N/A    Years of education: N/A     Occupational History    Not on file.      Social History Main Topics    Smoking status: Never Smoker    Smokeless tobacco: Never Used    Alcohol use No    Drug use: No    Sexual activity: Not Currently     Partners: Male     Other Topics Concern    Not on file     Social History Narrative    Lives alone     Family History   Problem Relation Age of Onset    Heart Disease Mother     Hypertension Mother     Breast Cancer Mother      Dec 56yo    Cancer Sister     No Known Problems Son           Objective:   ROS:  Per HPI-  Otherwise 10 point ROS was negative    Allergies   Allergen Reactions    Haldol [Haloperidol Lactate] Other (comments)       Meds:  Outpatient Medications Prior to Visit   Medication Sig Dispense Refill    cephALEXin (KEFLEX) 500 mg capsule Take 500 mg by mouth four (4) times daily.  naproxen (NAPROSYN) 500 mg tablet take 1 tablet by mouth twice a day  0    loratadine (CLARITIN) 10 mg tablet Take 1 Tab by mouth daily as needed for Allergies. 30 Tab 2    divalproex DR (DEPAKOTE) 500 mg tablet Take 1 Tab by mouth two (2) times a day. 180 Tab 1    potassium chloride (KAON 20%) 40 mEq/15 mL liqd take 7.5 milliliters by mouth once daily for HYPOKALEMIA PREVENTION 480 mL 1    inhalational spacing device 1 Each by Does Not Apply route as needed. 1 Device 0    albuterol (PROAIR HFA) 90 mcg/actuation inhaler Take 1 Puff by inhalation every four (4) hours as needed. Indications: BRONCHOSPASM PREVENTION 1 Inhaler 0    FLUoxetine (PROZAC) 10 mg capsule TAKE ONE CAPSULE BY MOUTH EVERY DAY 90 Cap 0    risperiDONE (RISPERDAL) 2 mg tablet Take 1 Tab by mouth two (2) times a day. 180 Tab 0    aspirin delayed-release 81 mg tablet Take 1 Tab by mouth daily. 30 Tab 3    gabapentin (NEURONTIN) 300 mg capsule Take 1 Cap by mouth two (2) times a day. 180 Cap 0    clotrimazole (LOTRIMIN) 1 % topical cream Apply  to affected area two (2) times a day.  ivermectin (STROMECTOL) 3 mg tablet Take  by mouth once.  FLUAD 9786-5125, 65 YR UP,,PF, syrg injection inject 0.5 milliliter intramuscularly  0    baclofen (LIORESAL) 10 mg tablet take 1/2 tablet by mouth twice a day 90 Tab 1    triamcinolone acetonide (KENALOG) 0.1 % ointment Apply  to affected area two (2) times a day. use thin layer as needed for itching. 30 g 0    furosemide (LASIX) 20 mg tablet Take 20 mg by mouth daily. 0    butalbital-acetaminophen-caff (FIORICET) -40 mg per capsule Take  by mouth every four (4) hours as needed for Pain.       benztropine (COGENTIN) 1 mg tablet Take 1 mg by mouth two (2) times a day.  0    anastrozole (ARIMIDEX) 1 mg tablet take 1 tablet by mouth once daily 90 Tab 0     No facility-administered medications prior to visit. Imaging:  MRI Results (most recent):    Results from Hospital Encounter encounter on 06/09/17   MRI LUMB SPINE WO CONT   Narrative EXAM:  MRI LUMB SPINE WO CONT    INDICATION:  Chronic low back pain and difficulty walking. COMPARISON: CT lumbar spine on 7/24/2015    TECHNIQUE: MR imaging of the lumbar spine was performed using the following  sequences: sagittal T1, T2, STIR;  axial T1, T2 performed on the open 0.7 Nelly  magnet. CONTRAST:  None. FINDINGS: For the purposes of this dictation, the L5-S1 disc space is on series  11, image 16. However, the lumbar spine counting on this case is difficult. There are 6 lumbar type vertebral bodies and a fully formed disc at S1-S2. If  any intervention or surgery is planned, correlation between the numbering used  for this study with any plain films and fluoroscopic findings should be  performed. Congenitally slender lumbar spinal canal measures 12 mm in AP diameter at L5. In  addition, there is epidural lipomatosis. There is normal alignment of the lumbar spine. Vertebral body heights are  maintained. Marrow signal is normal. Disc signal is within normal limits. The conus medullaris terminates at L2-L3. Signal and caliber of the distal  spinal cord are within normal limits. The paraspinal soft tissues are within normal limits. Lower thoracic spine: Moderate-severe central spinal canal stenosis at T10-T11. Moderate central spinal canal stenosis at T11-T12. L1-L2:  No herniation or stenosis. L2-L3:  Diffuse disc bulge. No stenosis. L3-L4:  Diffuse disc bulge, facet arthrosis, and thickened ligamentum flavum. Moderate central spinal canal stenosis. Moderate bilateral foraminal stenosis. L4-L5:  Diffuse disc bulge, facet arthrosis, and thickened ligamentum flavum. Severe central spinal canal stenosis. Moderate bilateral foraminal stenosis.     L5-S1:  Diffuse disc bulge, facet arthrosis, and thickened ligamentum flavum. Severe central spinal canal stenosis. Severe left and moderate right foraminal  stenosis. S1-S2: Diffuse disc bulge, facet arthrosis, and thickened ligamentum flavum. Mild central spinal canal stenosis. Mild left foraminal stenosis. Impression IMPRESSION:    1. Increased degenerative disc disease is superimposed on a congenitally slender  spinal canal and epidural lipomatosis. 2. 6 lumbar type vertebral bodies with counting detailed above. Please note that  the labeling system is different than the comparison CT from 2015.  3. Severe central spinal canal stenosis L4-5 and L5-S1.  4. Moderate central spinal canal stenosis at L3-4.  5. At least moderate central spinal canal stenosis in the lower thoracic spine. EPIC email sent to Dr. Carmelina Dave at the time of the dictation. CT Results (most recent):    Results from Hospital Encounter encounter on 09/19/16   CT ABD PELV W CONT   Narrative EXAM: CT ABDOMEN PELVIS WITH CONTRAST    INDICATION:  Chronic nonspecific lower abdominal pain, right greater than left,  evaluate for mass. COMPARISON: None. CONTRAST: 97 mL of Isovue-370. TECHNIQUE:   Multislice helical CT was performed from the diaphragm to the symphysis pubis  during uneventful rapid bolus intravenous contrast administration. Oral contrast  was also administered. Contiguous 5 mm axial images were reconstructed and  lung and soft tissue windows were generated. Coronal and sagittal reformations  were generated. CT dose reduction was achieved through use of a standardized  protocol tailored for this examination and automatic exposure control for dose  modulation. FINDINGS:          LOWER CHEST: There is left lower lobe airspace disease or atelectasis and there  are small bilateral pleural effusions. ABDOMEN:  Liver: The liver is normal in size and contour with no focal abnormality.  The  attenuation of the liver is decreased throughout. Gallbladder and bile ducts: There is no calcified gallstone or biliary  dilatation. Spleen: No abnormality. Pancreas: No abnormality. Adrenal glands: No abnormality. Kidneys: No abnormality. PELVIS:  Reproductive organs: The uterus is absent. Bladder: No abnormality. BOWEL AND MESENTERY: The small bowel is normal.  There is no mesenteric mass or  adenopathy. The appendix is absent. .  There is some stool throughout the colon. PERITONEUM: There is no ascites or free intraperitoneal air. RETROPERITONEUM: The aorta is atherosclerotic and tapers without aneurysm. There  is no retroperitoneal adenopathy or mass. There is no pelvic mass or adenopathy. BONES AND SOFT TISSUES: The bones and soft tissues of the abdominal wall are  within normal limits. The patient is obese. Impression IMPRESSION:   1. No mass, adenopathy or other acute abdominal or pelvic abnormality. 2. Status post hysterectomy. 3. Status post appendectomy. 4. Obesity with hepatic steatosis. 5. Mild atherosclerosis of the abdominal aorta without aneurysm. .               Reviewed records in [employer] and [facility] tab today    Lab Review   Results for orders placed or performed in visit on 60/39/03   METABOLIC PANEL, BASIC   Result Value Ref Range    Glucose 75 65 - 99 mg/dL    BUN 25 8 - 27 mg/dL    Creatinine 0.84 0.57 - 1.00 mg/dL    BUN/Creatinine ratio 30 (H) 12 - 28    Sodium 143 134 - 144 mmol/L    Potassium 4.4 3.5 - 5.2 mmol/L    Chloride 102 96 - 106 mmol/L    CO2 27 18 - 29 mmol/L    Calcium 10.0 8.7 - 10.3 mg/dL   AMB POC URINALYSIS DIP STICK AUTO W/O MICRO   Result Value Ref Range    Color (UA POC) Yellow     Clarity (UA POC) Clear     Glucose (UA POC) Negative Negative    Bilirubin (UA POC) Negative Negative    Ketones (UA POC) Negative Negative    Specific gravity (UA POC) 1.010 1.001 - 1.035    Blood (UA POC) Negative Negative    pH (UA POC) 5.5 4.6 - 8.0    Protein (UA POC) Negative Negative mg/dL    Urobilinogen (UA POC) 0.2 mg/dL 0.2 - 1    Nitrites (UA POC) Negative Negative    Leukocyte esterase (UA POC) Negative Negative        Exam:  Visit Vitals    /88    Pulse 78    Resp 18    Ht 5' 5\" (1.651 m)    Wt 122.5 kg (270 lb)    SpO2 91%    BMI 44.93 kg/m2     General:  Alert, cooperative, no distress. Head:  Normocephalic, without obvious abnormality, atraumatic. Respiratory:  Heart:   Non labored breathing  Regular rate and rhythm, no murmurs   Extrem: 2+ edema bilateral LE       Pulses: 2+ radial pulses. Neurologic:  MS: Alert and oriented x 4, speech intact. Language intact. Attention and fund of knowledge appropriate. Recent and remote memory intact. Cranial Nerves:  II: visual fields Full to confrontation   II: pupils    II: optic disc    III,VII: ptosis none   III,IV,VI: extraocular muscles  EOMI, no nystagmus or diplopia   V: facial light touch sensation     VII: facial muscle function   symmetric   VIII: hearing intact   IX: soft palate elevation     XI: trapezius strength     XI: sternocleidomastoid strength    XII: tongue       Motor: normal bulk and tone, bilateral dyskinesias in hands, mild orobuccal dyskinesias  Strength: 5/5 throughout, no PD  Sensory: (At previous visit -Markedly dec to vibration at ankles)  Coordination: FTN and RAYMOND intact  Gait: stooped posture, normal gait with rolling walker  Reflexes: 2+ symmetric in UE, unable to elicit in LE, possibly due to body habitus           Assessment/Plan   Pt is a 79 y.o. 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  Acute on chronic c/o bilateral LE numbness in feet, progressing up legs to buttocks at times, and increasing difficulties with imbalance and ambulation, found to have severe spinal stenosis on MRI L-Spine 6/9/17, and PN on NCS/EMG 1/4/16 of unclear etiology, possibly due to chemotherapy. Exam with stooped posture, mild orobuccal and hand dyskinesias, dec reflexes in LE, steady gait with walker with normal stride. Encouraged to continue close f/u with sleep clinic for treatment of JULIAN. Continue Depakote 500mg bid. Will complete DMV paperwork. F/u in clinic in six months, instructed to call in the interim with any questions or concerns. ICD-10-CM ICD-9-CM    1. Seizure disorder ([facility] G40.909 345.90    2. Gait disturbance R26.9 781.2    3. Spinal stenosis of lumbar region without neurogenic claudication M48.061 724.02    4. Bilateral leg numbness R20.0 782.0    5. Tardive dyskinesia G24.01 333.85        Signed:   Charis Kovacs MD  11/21/2017

## 2017-11-21 NOTE — PATIENT INSTRUCTIONS
[facility]   Statement to Patients  April 1, 2014      In an effort to ensure the large volume of patient prescription refills is processed in the most efficient and expeditious manner, we are asking our patients to assist us by calling your Pharmacy for all prescription refills, this will include also your  Mail Order Pharmacy. The pharmacy will contact our office electronically to continue the refill process. Please do not wait until the last minute to call your pharmacy. We need at least 48 hours (2days) to fill prescriptions. We also encourage you to call your pharmacy before going to  your prescription to make sure it is ready. With regard to controlled substance prescription refill requests (narcotic refills) that need to be picked up at our office, we ask your cooperation by providing us with at least 72 hours (3days) notice that you will need a refill. We will not refill narcotic prescription refill requests after 4:00pm on any weekday, Monday through Thursday, or after 2:00pm on Fridays, or on the weekends. We encourage everyone to explore another way of getting your prescription refill request processed using [employer], our patient web portal through our electronic medical record system. [employer] [facility] is an efficient and effective way to communicate your medication request directly to the office and  downloadable as an yomi on your smart phone . [employer] [facility] also features a review functionality that allows you to view your medication list as well as leave messages for your physician. Are you ready to get connected? If so please review the attatched instructions or speak to any of our staff to get you set up right away! Thank you so much for your cooperation. Should you have any questions please contact our Practice Administrator.     The Physicians and Staff,  [facility]           Please bring all medication bottles, including vitamins, supplements and any over-the-counter medications, to your next office visit.

## 2017-11-21 NOTE — MR AVS SNAPSHOT
Visit Information Date & Time Provider Department Dept. Phone Encounter #  
 11/21/2017  2:20 PM Dipika Swanson MD [facility] at [address] [place] [address] [id number] Follow-up Instructions Return in about 6 months (around 5/21/2018). Your Appointments 12/11/2017 10:45 AM  
ROUTINE CARE with Brenda Felix MD  
[facility] [address]) Appt Note: follow-up [address] [facility] [address] [facility] [address]  
[address] [facility] [address]  
  
    
 2/1/2018  9:00 AM  
Any with Manju Monroe MD  
[address] [facility] ([address]) Appt Note: 3 month cpap follow up [address] [facility] [address]  
[phone]  
  
   
 [address] [facility] [address] [facility] [address]  
  
    
 7/20/2018 11:00 AM  
Any with Manju Monroe MD  
[address] [place] [address] ([address]) Appt Note: YRLY CPAP f/u- bring machine [address] [address]  
[phone] Upcoming Health Maintenance Date Due  
 GLAUCOMA SCREENING Q2Y 8/15/2015 MEDICARE YEARLY EXAM 7/27/2018 Pneumococcal 65+ High/Highest Risk (2 of 2 - PPSV23) 6/1/2019 BREAST CANCER SCRN MAMMOGRAM 8/8/2019 COLON CANCER SCRN (BARIUM / CT COLO / FLX SIG) Q5 11/3/2021 DTaP/Tdap/Td series (2 - Td) 7/1/2027 Allergies as of 11/21/2017  Review Complete On: 11/21/2017 By: Gino Lopez LPN Severity Noted Reaction Type Reactions Haldol [Haloperidol Lactate]  08/26/2014    Other (comments) Current Immunizations  Reviewed on 8/11/2017 Name Date Influenza High Dose Vaccine PF 8/22/2016 Influenza Vaccine 8/7/2017, 10/15/2014 Influenza Vaccine Split 10/13/2010 Pneumococcal Conjugate (PCV-13) 7/21/2016 Pneumococcal Vaccine (Unspecified Type) 6/1/2014 Tdap 7/1/2017 Zoster Vaccine, Live 8/22/2016 Not reviewed this visit You Were Diagnosed With   
  
 Codes Comments Seizure disorder ([facility]    -  Primary ICD-10-CM: Z45.913 ICD-9-CM: 345.90 Gait disturbance     ICD-10-CM: R26.9 ICD-9-CM: 781.2 Spinal stenosis of lumbar region without neurogenic claudication     ICD-10-CM: M48.061 
ICD-9-CM: 724.02 Bilateral leg numbness     ICD-10-CM: R20.0 ICD-9-CM: 782.0 Tardive dyskinesia     ICD-10-CM: G24.01 
ICD-9-CM: 333.85 Vitals BP Pulse Resp Height(growth percentile) Weight(growth percentile) SpO2  
 140/88 78 18 5' 5\" (1.651 m) 270 lb (122.5 kg) 91% BMI OB Status Smoking Status 44.93 kg/m2 Hysterectomy Never Smoker Vitals History BMI and BSA Data Body Mass Index Body Surface Area 44.93 kg/m 2 2.37 m 2 Your Updated Medication List  
  
   
This list is accurate as of: 11/21/17  3:44 PM.  Always use your most recent med list.  
  
  
  
  
 albuterol 90 mcg/actuation inhaler Commonly known as:  PROAIR HFA Take 1 Puff by inhalation every four (4) hours as needed. Indications: BRONCHOSPASM PREVENTION  
  
 anastrozole 1 mg tablet Commonly known as:  ARIMIDEX  
take 1 tablet by mouth once daily  
  
 aspirin delayed-release 81 mg tablet Take 1 Tab by mouth daily. baclofen 10 mg tablet Commonly known as:  LIORESAL  
take 1/2 tablet by mouth twice a day  
  
 benztropine 1 mg tablet Commonly known as:  COGENTIN Take 1 mg by mouth two (2) times a day. cephALEXin 500 mg capsule Commonly known as:  Edwige Officer Take 500 mg by mouth four (4) times daily. divalproex  mg tablet Commonly known as:  DEPAKOTE Take 1 Tab by mouth two (2) times a day. FIORICET -40 mg per capsule Generic drug:  butalbital-acetaminophen-caff Take  by mouth every four (4) hours as needed for Pain. FLUAD 0813-1211 (65 YR UP)(PF) Syrg injection Generic drug:  influenza vaccine 2017-18 (65 yrs+)(PF)  
inject 0.5 milliliter intramuscularly FLUoxetine 10 mg capsule Commonly known as:  PROzac TAKE ONE CAPSULE BY MOUTH EVERY DAY  
  
 gabapentin 300 mg capsule Commonly known as:  NEURONTIN Take 1 Cap by mouth two (2) times a day. inhalational spacing device 1 Each by Does Not Apply route as needed. loratadine 10 mg tablet Commonly known as:  Shalini Hunting Take 1 Tab by mouth daily as needed for Allergies. naproxen 500 mg tablet Commonly known as:  NAPROSYN  
take 1 tablet by mouth twice a day  
  
 potassium chloride 40 mEq/15 mL Liqd Commonly known as:  KAON 20%  
take 7.5 milliliters by mouth once daily for HYPOKALEMIA PREVENTION  
  
 risperiDONE 2 mg tablet Commonly known as:  RisperDAL Take 1 Tab by mouth two (2) times a day. Follow-up Instructions Return in about 6 months (around 5/21/2018). Patient Instructions PRESCRIPTION REFILL POLICY [facility] Statement to Patients April 1, 2014 In an effort to ensure the large volume of patient prescription refills is processed in the most efficient and expeditious manner, we are asking our patients to assist us by calling your Pharmacy for all prescription refills, this will include also your  Mail Order Pharmacy. The pharmacy will contact our office electronically to continue the refill process. Please do not wait until the last minute to call your pharmacy. We need at least 48 hours (2days) to fill prescriptions. We also encourage you to call your pharmacy before going to  your prescription to make sure it is ready. With regard to controlled substance prescription refill requests (narcotic refills) that need to be picked up at our office, we ask your cooperation by providing us with at least 72 hours (3days) notice that you will need a refill. We will not refill narcotic prescription refill requests after 4:00pm on any weekday, Monday through Thursday, or after 2:00pm on Fridays, or on the weekends. We encourage everyone to explore another way of getting your prescription refill request processed using [employer], our patient web portal through our electronic medical record system. [employer] is an efficient and effective way to communicate your medication request directly to the office and  downloadable as an yomi on your smart phone . [employer] also features a review functionality that allows you to view your medication list as well as leave messages for your physician. Are you ready to get connected? If so please review the attatched instructions or speak to any of our staff to get you set up right away! Thank you so much for your cooperation. Should you have any questions please contact our Practice Administrator. The Physicians and Staff,  [facility] Please bring all medication bottles, including vitamins, supplements and any over-the-counter medications, to your next office visit. Introducing [facility]! Dear Lucas Gamez: Thank you for requesting a [employer] account. Our records indicate that you already have an active [employer] account. You can access your account anytime at [email or web address] Did you know that you can access your hospital and ER discharge instructions at any time in [employer]? You can also review all of your test results from your hospital stay or ER visit. Additional Information If you have questions, please visit the Frequently Asked Questions section of the [employer] website at [email or web address] Remember, [employer] is NOT to be used for urgent needs. For medical emergencies, dial 911. Now available from your iPhone and Android! Please provide this summary of care documentation to your next provider. Your primary care clinician is listed as Meghan Bernabe. If you have any questions after today's visit, please call [phone].

## 2017-12-15 NOTE — TELEPHONE ENCOUNTER
Spoke with patient. She stated that [facility] sent her a letter stating they did not receive an updated customer medical report form (MED 2 form). Writer advised it was faxed to [facility] on 12/1/17, but will fax it again. Patient was given an opportunity to ask questions, repeated information, and verbalized understanding.

## 2018-01-01 RX ORDER — DIVALPROEX SODIUM 500 MG/1
TABLET, DELAYED RELEASE ORAL
Qty: 180 TAB | Refills: 1 | Status: SHIPPED | OUTPATIENT
Start: 2018-01-01

## 2022-03-03 NOTE — TELEPHONE ENCOUNTER
Advised patient at last refill must follow up.  Refuse refill    Malia Baker MD
LOV: May 16, 2017  UOV:July 26, 2017
no

## 2024-05-09 NOTE — PROGRESS NOTES
[address] [facility] [place]. [place], [address]  Tel.  [phone]  Fax. [address] [facility] [address]  [place], [address] [facility]  Tel.  [phone]  Fax. [phone] [address] [facility] [address]   Tel.  [phone]  Fax. [phone]         Subjective:      Serina Kothari is an 77 y.o. female referred for evaluation for a sleep disorder. She complains of snoring associated with periods of not breathing, awakening in the middle of the night because of urination. Symptoms began several years ago, She was diagnosed with JULIAN and prescribed CPAP which she lost due to low adherence. She usually can fall asleep in 10 minutes. Family or house members note snoring. She denies completely or partially paralyzed while falling asleep or waking up. Serina Kothari does wake up frequently at night. She is bothered by waking up too early and left unable to get back to sleep. She actually sleeps about 6 hours at night and wakes up about 3-4 times during the night. She does not work shifts:  . Sylvia Sands indicates she does not get too little sleep at night. Her bedtime is 10:00 pm. She awakens at noon. She does take naps. She takes 2-3 naps a day lasting 15-30 minutes. She has the following observed behaviors:none. Other remarks:  Lost old CPAP device set at 7 cmH2O about 1 month previously due to low adherence. [place] Sleepiness Score: 17 which reflect severe daytime drowsiness. Allergies   Allergen Reactions    Haldol [Haloperidol Lactate] Other (comments)         Current Outpatient Prescriptions:     polyethylene glycol (MIRALAX) 17 gram/dose powder, Take 8.5 g by mouth daily. , Disp: 850 g, Rfl: 5    potassium chloride (KAON 20%) 40 mEq/15 mL liqd, take 7.5 milliliters by mouth once daily for HYPOKALEMIA PREVENTION, Disp: 480 mL, Rfl: 1    divalproex DR (DEPAKOTE) 500 mg tablet, Take 1 Tab by mouth two (2) times a day., Disp: 180 Tab, Rfl: 1    inhalational spacing Pa approved 2/7/24 to 5/8/25 I called and notified pharmacy and left message to notify daughter .    device, 1 Each by Does Not Apply route as needed. , Disp: 1 Device, Rfl: 0    baclofen (LIORESAL) 10 mg tablet, take 1/2 tablet by mouth twice a day, Disp: 90 Tab, Rfl: 1    albuterol (PROAIR HFA) 90 mcg/actuation inhaler, Take 1 Puff by inhalation every four (4) hours as needed. Indications: BRONCHOSPASM PREVENTION, Disp: 1 Inhaler, Rfl: 0    benztropine (COGENTIN) 1 mg tablet, Take 1 mg by mouth two (2) times a day., Disp: , Rfl: 0    anastrozole (ARIMIDEX) 1 mg tablet, take 1 tablet by mouth once daily, Disp: 90 Tab, Rfl: 0    loratadine (CLARITIN) 10 mg tablet, TAKE 1 TABLET BY MOUTH EVERY DAY, Disp: 90 Tab, Rfl: 4    FLUoxetine (PROZAC) 10 mg capsule, TAKE ONE CAPSULE BY MOUTH EVERY DAY, Disp: 90 Cap, Rfl: 0    aspirin delayed-release 81 mg tablet, Take 1 Tab by mouth daily. , Disp: 30 Tab, Rfl: 3    butalbital-acetaminophen-caffeine (FIORICET, ESGIC) -40 mg per tablet, Take 1 Tab by mouth every four (4) hours as needed for Headache., Disp: 60 Tab, Rfl: 3    gabapentin (NEURONTIN) 300 mg capsule, Take 1 Cap by mouth two (2) times a day., Disp: 180 Cap, Rfl: 0    risperiDONE (RISPERDAL) 2 mg tablet, Take 1 Tab by mouth two (2) times a day., Disp: 180 Tab, Rfl: 0     She  has a past medical history of Anemia (12/1/2014); ARF (acute renal failure) ([facility] (12/5/2014); Edema; Encephalopathy (12/5/2014); Epilep NEC w/o intr epil ([facility] Fall (2/2/2015); Gait disturbance; Hyperlipidemia; Hypertension; Infiltrating ductal carcinoma of breast ([facility] Peripheral neuropathy ([facility] Psychiatric disorder; Rhabdomyolysis; Schizoaffective disorder; Sepsis ([facility] Stroke [facility]); Syncope (11/30/2014); Tardive dyskinesia; and Temporal lobe lesion. She  has a past surgical history that includes orthopaedic (7/2005); breast surgery procedure unlisted; colonoscopy (N/A, 11/3/2016); total abdominal hysterectomy (1980); and craniotomy. She family history includes Breast Cancer in her mother; Cancer in her sister;  Heart Disease in her mother; Hypertension in her mother; No Known Problems in her son. She  reports that she has never smoked. She has never used smokeless tobacco. She reports that she does not drink alcohol or use illicit drugs. Review of Systems:  Constitutional: significant  weight gain  Eyes:  No blurred vision  CVS:  No significant chest pain  Pulm:  No significant shortness of breath  GI:  No significant nausea or vomiting  :  significant nocturia  Musculoskeletal:  significant joint pain at night  Skin:  No significant rashes  Neuro:  No significant dizziness   Psych:  No active mood issues    Sleep Review of Systems: notable for no difficulty falling asleep; frequent awakenings at night;  regular dreaming not reported; no nightmares ; early morning headaches; no memory problems; no concentration issues; no history of any automobile or occupational accidents due to daytime drowsiness. Objective:     Visit Vitals    /82    Pulse 78    Ht 5' 5\" (1.651 m)    Wt 246 lb (111.6 kg)    SpO2 94%    BMI 40.94 kg/m2         General:   Not in acute distress   Eyes:  Anicteric sclerae, no obvious strabismus   Nose:  No obvious nasal septum deviation    Oropharynx:   Class 4 oropharyngeal outlet, thick tongue base, uvula could not be seen due to low-lying soft palate, narrow tonsilo-pharyngeal pilars   Tonsils:   tonsils are not seen due to low-lying soft palate   Neck:    midline trachea   Chest/Lungs:  Equal lung expansion, clear on auscultation    CVS:  Normal rate, regular rhythm; no JVD   Skin:  Warm to touch; no obvious rashes   Neuro:  No focal deficits ; no obvious tremor    Psych:  Normal affect,  normal countenance;          Assessment:       ICD-10-CM ICD-9-CM    1. JULIAN (obstructive sleep apnea) G47.33 327.23 SPLIT CPAP/PSG   2. Chronic obstructive pulmonary disease, unspecified COPD type ([facility] J44.9 496    3. BMI 40.0-44.9, adult ([facility] Z68.41 V85.41    4.  H/O: stroke Z86.73 V12.54 Plan:     * The patient currently has a High Risk for having sleep apnea. STOP-BANG score 6.  * Sleep testing was ordered for initial evaluation. * She was provided information on sleep apnea including coresponding risk factors and the importance of proper treatment. * Treatment options if indicated were reviewed today. Patient agrees to a trial of PAP therapy if indicated. * Counseling was provided regarding proper sleep hygiene (including effect of light on sleep) and safe driving. * Effect of sleep disturbance on weight was reviewed. We have recommended a dedicated weight loss through appropriate diet and an exercise regiment as significant weight reduction has been shown to reduce severity of obstructive sleep apnea. * Telephone [phone]  follow-up shortly after sleep study to review results and plan final management.     (patient has given permission for a message to be left regarding test results and further management if patient cannot be cannot be reached directly). Thank you for allowing us to participate in your patient's medical care. We'll keep you updated on these investigations. Jeniffer Navas MD, FAASM  Diplomate American Board of Sleep Medicine  Diplomate in Sleep Medicine - ABP  Electronically signed. Office visit exceeded 25 minutes with counseling and direction of care taking up more than 50% of the allotted time.